# Patient Record
Sex: FEMALE | Race: WHITE | Employment: UNEMPLOYED | URBAN - METROPOLITAN AREA
[De-identification: names, ages, dates, MRNs, and addresses within clinical notes are randomized per-mention and may not be internally consistent; named-entity substitution may affect disease eponyms.]

---

## 2017-12-26 ENCOUNTER — HOSPITAL ENCOUNTER (OUTPATIENT)
Age: 57
Setting detail: SPECIMEN
Discharge: HOME | End: 2017-12-26
Payer: MEDICAID

## 2017-12-26 LAB
ANION GAP SERPL CALCULATED.3IONS-SCNC: 15 MMOL/L (ref 9–17)
BUN BLDV-MCNC: 6 MG/DL (ref 6–20)
BUN/CREAT BLD: ABNORMAL (ref 9–20)
CALCIUM SERPL-MCNC: 7.9 MG/DL (ref 8.6–10.4)
CHLORIDE BLD-SCNC: 99 MMOL/L (ref 98–107)
CO2: 23 MMOL/L (ref 20–31)
CREAT SERPL-MCNC: 0.65 MG/DL (ref 0.5–0.9)
GFR AFRICAN AMERICAN: >60 ML/MIN
GFR NON-AFRICAN AMERICAN: >60 ML/MIN
GFR SERPL CREATININE-BSD FRML MDRD: ABNORMAL ML/MIN/{1.73_M2}
GFR SERPL CREATININE-BSD FRML MDRD: ABNORMAL ML/MIN/{1.73_M2}
GLUCOSE BLD-MCNC: 75 MG/DL (ref 70–99)
HCT VFR BLD CALC: 29.5 % (ref 36.3–47.1)
HEMOGLOBIN: 8.6 G/DL (ref 11.9–15.1)
MCH RBC QN AUTO: 25.6 PG (ref 25.2–33.5)
MCHC RBC AUTO-ENTMCNC: 29.2 G/DL (ref 28.4–34.8)
MCV RBC AUTO: 87.8 FL (ref 82.6–102.9)
PDW BLD-RTO: 19.2 % (ref 11.8–14.4)
PLATELET # BLD: 462 K/UL (ref 138–453)
PMV BLD AUTO: 10.5 FL (ref 8.1–13.5)
POTASSIUM SERPL-SCNC: 4.3 MMOL/L (ref 3.7–5.3)
RBC # BLD: 3.36 M/UL (ref 3.95–5.11)
SODIUM BLD-SCNC: 137 MMOL/L (ref 135–144)
T4 TOTAL: 5.5 UG/DL (ref 4.5–12)
TSH SERPL DL<=0.05 MIU/L-ACNC: 19.14 MIU/L (ref 0.3–5)
WBC # BLD: 11.8 K/UL (ref 3.5–11.3)

## 2017-12-26 PROCEDURE — P9603 ONE-WAY ALLOW PRORATED MILES: HCPCS

## 2017-12-26 PROCEDURE — 84436 ASSAY OF TOTAL THYROXINE: CPT

## 2017-12-26 PROCEDURE — 85027 COMPLETE CBC AUTOMATED: CPT

## 2017-12-26 PROCEDURE — 84443 ASSAY THYROID STIM HORMONE: CPT

## 2017-12-26 PROCEDURE — 36415 COLL VENOUS BLD VENIPUNCTURE: CPT

## 2017-12-26 PROCEDURE — 80048 BASIC METABOLIC PNL TOTAL CA: CPT

## 2017-12-28 ENCOUNTER — HOSPITAL ENCOUNTER (OUTPATIENT)
Age: 57
Setting detail: SPECIMEN
Discharge: HOME | End: 2017-12-28
Payer: MEDICAID

## 2017-12-28 LAB
DATE, STOOL #1: 12
DATE, STOOL #2: NORMAL
DATE, STOOL #3: NORMAL
HEMOCCULT SP1 STL QL: NEGATIVE
HEMOCCULT SP2 STL QL: NORMAL
HEMOCCULT SP3 STL QL: NORMAL
TIME, STOOL #1: 400
TIME, STOOL #2: NORMAL
TIME, STOOL #3: NORMAL

## 2017-12-28 PROCEDURE — 82272 OCCULT BLD FECES 1-3 TESTS: CPT

## 2017-12-29 ENCOUNTER — HOSPITAL ENCOUNTER (OUTPATIENT)
Age: 57
Setting detail: SPECIMEN
Discharge: HOME | End: 2017-12-29
Payer: MEDICAID

## 2017-12-29 LAB — PREALBUMIN: 15.2 MG/DL (ref 20–40)

## 2017-12-29 PROCEDURE — 84134 ASSAY OF PREALBUMIN: CPT

## 2017-12-29 PROCEDURE — P9603 ONE-WAY ALLOW PRORATED MILES: HCPCS

## 2017-12-29 PROCEDURE — 36415 COLL VENOUS BLD VENIPUNCTURE: CPT

## 2018-01-03 ENCOUNTER — HOSPITAL ENCOUNTER (OUTPATIENT)
Dept: WOUND CARE | Age: 58
Discharge: HOME | End: 2018-01-03
Payer: COMMERCIAL

## 2018-01-03 VITALS
WEIGHT: 220 LBS | TEMPERATURE: 99.7 F | RESPIRATION RATE: 20 BRPM | SYSTOLIC BLOOD PRESSURE: 120 MMHG | BODY MASS INDEX: 34.53 KG/M2 | HEIGHT: 67 IN | DIASTOLIC BLOOD PRESSURE: 88 MMHG | HEART RATE: 106 BPM

## 2018-01-03 DIAGNOSIS — S31.109D OPEN WOUND OF ABDOMINAL WALL, SUBSEQUENT ENCOUNTER: ICD-10-CM

## 2018-01-03 DIAGNOSIS — K57.20 DIVERTICULITIS OF LARGE INTESTINE WITH PERFORATION WITHOUT BLEEDING: ICD-10-CM

## 2018-01-03 DIAGNOSIS — T81.89XD DELAYED SURGICAL WOUND HEALING, SUBSEQUENT ENCOUNTER: ICD-10-CM

## 2018-01-03 DIAGNOSIS — E44.0 MODERATE PROTEIN-CALORIE MALNUTRITION (HCC): ICD-10-CM

## 2018-01-03 PROBLEM — E46 MALNUTRITION (HCC): Status: ACTIVE | Noted: 2018-01-03

## 2018-01-03 PROBLEM — T81.89XA DELAYED SURGICAL WOUND HEALING: Status: ACTIVE | Noted: 2018-01-03

## 2018-01-03 PROBLEM — S31.109A OPEN ABDOMINAL WALL WOUND: Status: ACTIVE | Noted: 2018-01-03

## 2018-01-03 PROCEDURE — 87186 SC STD MICRODIL/AGAR DIL: CPT

## 2018-01-03 PROCEDURE — 6370000000 HC RX 637 (ALT 250 FOR IP): Performed by: SURGERY

## 2018-01-03 PROCEDURE — 87075 CULTR BACTERIA EXCEPT BLOOD: CPT

## 2018-01-03 PROCEDURE — 87077 CULTURE AEROBIC IDENTIFY: CPT

## 2018-01-03 PROCEDURE — 97605 NEG PRS WND THER DME<=50SQCM: CPT

## 2018-01-03 PROCEDURE — 99214 OFFICE O/P EST MOD 30 MIN: CPT

## 2018-01-03 PROCEDURE — 87070 CULTURE OTHR SPECIMN AEROBIC: CPT

## 2018-01-03 PROCEDURE — 87205 SMEAR GRAM STAIN: CPT

## 2018-01-03 PROCEDURE — 11043 DBRDMT MUSC&/FSCA 1ST 20/<: CPT

## 2018-01-03 PROCEDURE — 87176 TISSUE HOMOGENIZATION CULTR: CPT

## 2018-01-03 RX ORDER — M-VIT,TX,IRON,MINS/CALC/FOLIC 27MG-0.4MG
1 TABLET ORAL DAILY
COMMUNITY
End: 2018-02-14 | Stop reason: HOSPADM

## 2018-01-03 RX ORDER — FERROUS SULFATE 325(65) MG
325 TABLET ORAL
COMMUNITY
End: 2018-02-14 | Stop reason: RX

## 2018-01-03 RX ORDER — ASCORBIC ACID 500 MG
500 TABLET ORAL DAILY
COMMUNITY
End: 2018-03-26

## 2018-01-03 RX ORDER — ONDANSETRON 4 MG/1
4 TABLET, FILM COATED ORAL EVERY 4 HOURS PRN
COMMUNITY
End: 2018-03-26

## 2018-01-03 RX ORDER — MIRTAZAPINE 7.5 MG/1
7.5 TABLET, FILM COATED ORAL NIGHTLY
COMMUNITY
End: 2018-03-26

## 2018-01-03 RX ORDER — OXYCODONE HYDROCHLORIDE AND ACETAMINOPHEN 5; 325 MG/1; MG/1
1 TABLET ORAL EVERY 4 HOURS PRN
COMMUNITY
End: 2020-02-18

## 2018-01-03 RX ORDER — LEVOTHYROXINE SODIUM 0.03 MG/1
25 TABLET ORAL EVERY MORNING
COMMUNITY
End: 2020-02-18

## 2018-01-03 RX ADMIN — LIDOCAINE HYDROCHLORIDE: 20 JELLY TOPICAL at 09:12

## 2018-01-03 ASSESSMENT — PAIN DESCRIPTION - PAIN TYPE: TYPE: CHRONIC PAIN

## 2018-01-03 ASSESSMENT — PAIN DESCRIPTION - FREQUENCY: FREQUENCY: INTERMITTENT

## 2018-01-03 ASSESSMENT — PAIN DESCRIPTION - DESCRIPTORS: DESCRIPTORS: SHARP;DULL

## 2018-01-03 ASSESSMENT — PAIN DESCRIPTION - LOCATION: LOCATION: ABDOMEN

## 2018-01-03 NOTE — PROGRESS NOTES
Wound Care History and Physical    PATIENT NAME: Oliverio Reece   YOB: 1960    ADMISSION DATE: 1/3/2018  8:30 AM      TODAY'S DATE: 1/3/2018    CHIEF COMPLAINT:  Abdominal wall wound      HISTORY OF PRESENT ILLNESS:  HISTORY of PRESENT ILLNESS HPI   Hema Silva is a 62 y.o. female who presents today for wound evaluation. Wound Type:non-healing surgical  Wound Location:abdomen  Modifying factors:decreased mobility, obesity and non-adherence    There is no problem list on file for this patient. Past Medical History:        Diagnosis Date    Blood circulation, collateral     GERD (gastroesophageal reflux disease)        Past Surgical History:        Procedure Laterality Date    ABDOMEN SURGERY      laparotomy/bowel resection    APPENDECTOMY      CHOLECYSTECTOMY      HERNIA REPAIR      umbilical    SMALL INTESTINE SURGERY         Medications Prior to Admission:   Current Outpatient Prescriptions   Medication Sig Dispense Refill    ferrous sulfate 325 (65 Fe) MG tablet Take 325 mg by mouth daily (with breakfast)      levothyroxine (SYNTHROID) 25 MCG tablet Take 25 mcg by mouth every morning      mirtazapine (REMERON) 7.5 MG tablet Take 7.5 mg by mouth nightly      Multiple Vitamins-Minerals (THERAPEUTIC MULTIVITAMIN-MINERALS) tablet Take 1 tablet by mouth daily      oxyCODONE-acetaminophen (PERCOCET) 5-325 MG per tablet Take 1 tablet by mouth every 4 hours as needed for Pain.       Probiotic Product (PROBIOTIC-10) CAPS Take 1 tablet by mouth 3 times daily      ascorbic acid (VITAMIN C) 500 MG tablet Take 500 mg by mouth daily      ondansetron (ZOFRAN) 4 MG tablet Take 4 mg by mouth every 4 hours as needed for Nausea or Vomiting       Current Facility-Administered Medications   Medication Dose Route Frequency Provider Last Rate Last Dose    lidocaine (XYLOCAINE) 2 % jelly   Topical PRN Ebbie Arm, DO           Allergies:  Codeine    Social History:   Social History Social History    Marital status: Single     Spouse name: N/A    Number of children: N/A    Years of education: N/A     Occupational History          Social History Main Topics    Smoking status: Light Tobacco Smoker    Smokeless tobacco: Never Used      Comment: off and on/with stress    Alcohol use No    Drug use: No    Sexual activity: Not on file     Other Topics Concern    Not on file     Social History Narrative    No narrative on file       Family History:       Problem Relation Age of Onset    Cancer Mother     Coronary Art Dis Mother     Diabetes Mother     Heart Disease Mother     Parkinsonism Mother     Coronary Art Dis Father     Heart Disease Father     Diabetes Brother     Heart Disease Brother     High Cholesterol Brother     High Blood Pressure Brother     Cancer Maternal Aunt     Cancer Maternal Grandmother     Diabetes Brother     High Cholesterol Brother     High Blood Pressure Brother        REVIEW OF SYSTEMS:    CONSTITUTIONAL:  No recent weight gain/loss. Energy level normal for pt. No fever, chills or sweats  HEENT:  No change in vision, no change in hearing,  No cough or hoarseness  CARDIOVASCULAR:  No chest pain, no shortness of breath  GASTROINTESTINAL:  No abdominal pain, nausea, or vomiting. No constipation/diarrhea. No rectal bleeding,   GENITOURINARY:  No dysuria, no pneumoturia  HEMATOLOGIC/LYMPHATIC:  No easy bruising. No rashes No history of cancer  ENDOCRINE:  negative   Review of systems negative unless above.     PHYSICAL EXAM:    VITALS:  /88   Pulse 106   Temp 99.7 °F (37.6 °C) (Tympanic)   Resp 20   Ht 5' 7\" (1.702 m)   Wt 220 lb (99.8 kg)   BMI 34.46 kg/m²   INTAKE/OUTPUT:   No intake or output data in the 24 hours ending 01/03/18 0941    CONSTITUTIONAL:  awake, alert, not distressed and moderately obese  Psychiatric: judgement and insight seem appropriate  Head:  normocephalic/atraumatic, without obvious abnormality  Eyes: PERRL, Sclera nonicteric  NECK:  supple, symmetrical, trachea midline  LUNGS:  CTA bilaterally, no ronchi, rales, or wheezes, no intracostal muscle retractions or accessory muscle use  CARDIOVASCULAR:  regular rate and rhythm and No Murmur, rub,  Or gallops  ABDOMEN: soft, open midline wound with good granulation tissue, fascia layer exposed, right upper quadrant loop colostomy pink and functional  MUSCULOSKELETAL:  negative, there is not obvious somatic dysfunction  NEUROLOGIC:  Mental Status Exam:  Level of Alertness:   alert  Orientation:   oriented to person, place, and time          ASSESSMENT   Patient Active Problem List   Diagnosis    Open abdominal wall wound    Delayed surgical wound healing    Diverticulitis of colon with perforation    Malnutrition ([facility] [address]         Pre Debridement Measurements:  Wound 01/03/18 #1 abdomen,surgery dec. 2017 (Active)   Wound Type Incision 1/3/2018  8:40 AM   Wound Other 1/3/2018  8:40 AM   Dressing Status Old drainage 1/3/2018  8:40 AM   Dressing Changed Changed/New 1/3/2018  8:40 AM   Wound Length (cm) 26 cm 1/3/2018  8:40 AM   Wound Width (cm) 7 cm 1/3/2018  8:40 AM   Wound Depth (cm)  4.5 1/3/2018  8:40 AM   Calculated Wound Size (cm^2) (l*w) 182 cm^2 1/3/2018  8:40 AM   Drainage Amount Moderate 1/3/2018  8:40 AM   Drainage Description Serosanguinous 1/3/2018  8:40 AM   Margins Defined edges; Attached edges 1/3/2018  8:40 AM   Exposed structure Fascia 1/3/2018  8:40 AM   Red%Wound Bed 70 1/3/2018  8:40 AM   Yellow%Wound Bed 30 1/3/2018  8:40 AM   Debridement per physician Muscle 1/3/2018  8:40 AM   Time out Yes 1/3/2018  8:40 AM   Procedural Pain 0 1/3/2018  8:40 AM   Post procedural Pain 0 1/3/2018  8:40 AM   Number of days: 0          Procedure Note    Surgeon: Minh Friedman DO    Anesthesia: lidocaine gel    Debridement: Excisional Debridement    Using curette the wound was sharply debrided    down through and including the removal of subcutaneous

## 2018-01-08 ENCOUNTER — HOSPITAL ENCOUNTER (OUTPATIENT)
Dept: WOUND CARE | Age: 58
Discharge: HOME | End: 2018-01-08
Payer: COMMERCIAL

## 2018-01-08 LAB
CULTURE: ABNORMAL
DIRECT EXAM: ABNORMAL
DIRECT EXAM: ABNORMAL
Lab: ABNORMAL
ORGANISM: ABNORMAL
SPECIMEN DESCRIPTION: ABNORMAL
SPECIMEN DESCRIPTION: ABNORMAL
STATUS: ABNORMAL

## 2018-01-15 ENCOUNTER — HOSPITAL ENCOUNTER (OUTPATIENT)
Age: 58
Setting detail: SPECIMEN
Discharge: HOME | End: 2018-01-15
Payer: MEDICAID

## 2018-01-15 ENCOUNTER — HOSPITAL ENCOUNTER (OUTPATIENT)
Dept: WOUND CARE | Age: 58
Discharge: HOME | End: 2018-01-15
Payer: COMMERCIAL

## 2018-01-15 LAB — PREALBUMIN: 15.3 MG/DL (ref 20–40)

## 2018-01-15 PROCEDURE — 84134 ASSAY OF PREALBUMIN: CPT

## 2018-01-15 PROCEDURE — P9603 ONE-WAY ALLOW PRORATED MILES: HCPCS

## 2018-01-15 PROCEDURE — 36415 COLL VENOUS BLD VENIPUNCTURE: CPT

## 2018-01-17 ENCOUNTER — HOSPITAL ENCOUNTER (OUTPATIENT)
Age: 58
Setting detail: SPECIMEN
Discharge: HOME | End: 2018-01-17
Payer: MEDICAID

## 2018-01-17 LAB
ANION GAP SERPL CALCULATED.3IONS-SCNC: 12 MMOL/L (ref 9–17)
BUN BLDV-MCNC: 12 MG/DL (ref 6–20)
BUN/CREAT BLD: ABNORMAL (ref 9–20)
CALCIUM SERPL-MCNC: 8.2 MG/DL (ref 8.6–10.4)
CHLORIDE BLD-SCNC: 103 MMOL/L (ref 98–107)
CO2: 26 MMOL/L (ref 20–31)
CREAT SERPL-MCNC: 0.48 MG/DL (ref 0.5–0.9)
GFR AFRICAN AMERICAN: >60 ML/MIN
GFR NON-AFRICAN AMERICAN: >60 ML/MIN
GFR SERPL CREATININE-BSD FRML MDRD: ABNORMAL ML/MIN/{1.73_M2}
GFR SERPL CREATININE-BSD FRML MDRD: ABNORMAL ML/MIN/{1.73_M2}
GLUCOSE BLD-MCNC: 97 MG/DL (ref 70–99)
HCT VFR BLD CALC: 31.8 % (ref 36.3–47.1)
HEMOGLOBIN: 9.2 G/DL (ref 11.9–15.1)
MCH RBC QN AUTO: 25.5 PG (ref 25.2–33.5)
MCHC RBC AUTO-ENTMCNC: 28.9 G/DL (ref 28.4–34.8)
MCV RBC AUTO: 88.1 FL (ref 82.6–102.9)
NRBC AUTOMATED: 0 PER 100 WBC
PDW BLD-RTO: 19.7 % (ref 11.8–14.4)
PLATELET # BLD: 451 K/UL (ref 138–453)
PMV BLD AUTO: 10.7 FL (ref 8.1–13.5)
POTASSIUM SERPL-SCNC: 4.6 MMOL/L (ref 3.7–5.3)
RBC # BLD: 3.61 M/UL (ref 3.95–5.11)
SODIUM BLD-SCNC: 141 MMOL/L (ref 135–144)
WBC # BLD: 9.3 K/UL (ref 3.5–11.3)

## 2018-01-17 PROCEDURE — P9603 ONE-WAY ALLOW PRORATED MILES: HCPCS

## 2018-01-17 PROCEDURE — 85027 COMPLETE CBC AUTOMATED: CPT

## 2018-01-17 PROCEDURE — 80048 BASIC METABOLIC PNL TOTAL CA: CPT

## 2018-01-17 PROCEDURE — 36415 COLL VENOUS BLD VENIPUNCTURE: CPT

## 2018-01-19 ENCOUNTER — HOSPITAL ENCOUNTER (OUTPATIENT)
Dept: WOUND CARE | Age: 58
Discharge: HOME | End: 2018-01-19
Payer: MEDICAID

## 2018-01-19 VITALS
DIASTOLIC BLOOD PRESSURE: 72 MMHG | HEART RATE: 111 BPM | TEMPERATURE: 99.5 F | SYSTOLIC BLOOD PRESSURE: 102 MMHG | RESPIRATION RATE: 18 BRPM

## 2018-01-19 DIAGNOSIS — K63.2 COLOCUTANEOUS FISTULA: Chronic | ICD-10-CM

## 2018-01-19 PROBLEM — T81.89XA DELAYED SURGICAL WOUND HEALING: Chronic | Status: ACTIVE | Noted: 2018-01-03

## 2018-01-19 PROBLEM — S31.109A OPEN ABDOMINAL WALL WOUND: Chronic | Status: ACTIVE | Noted: 2018-01-03

## 2018-01-19 PROBLEM — K57.20 DIVERTICULITIS OF COLON WITH PERFORATION: Chronic | Status: ACTIVE | Noted: 2018-01-03

## 2018-01-19 PROBLEM — E46 MALNUTRITION (HCC): Chronic | Status: ACTIVE | Noted: 2018-01-03

## 2018-01-19 PROCEDURE — 11046 DBRDMT MUSC&/FSCA EA ADDL: CPT

## 2018-01-19 PROCEDURE — 11043 DBRDMT MUSC&/FSCA 1ST 20/<: CPT

## 2018-01-19 PROCEDURE — 97606 NEG PRS WND THER DME>50 SQCM: CPT

## 2018-01-19 PROCEDURE — 6370000000 HC RX 637 (ALT 250 FOR IP): Performed by: SURGERY

## 2018-01-19 RX ADMIN — LIDOCAINE HYDROCHLORIDE: 20 JELLY TOPICAL at 14:28

## 2018-01-19 ASSESSMENT — PAIN DESCRIPTION - DESCRIPTORS: DESCRIPTORS: SHARP

## 2018-01-19 ASSESSMENT — PAIN DESCRIPTION - ONSET: ONSET: ON-GOING

## 2018-01-19 ASSESSMENT — PAIN DESCRIPTION - LOCATION: LOCATION: ABDOMEN

## 2018-01-19 ASSESSMENT — PAIN SCALES - GENERAL: PAINLEVEL_OUTOF10: 2

## 2018-01-19 ASSESSMENT — PAIN DESCRIPTION - ORIENTATION: ORIENTATION: MID

## 2018-01-19 ASSESSMENT — PAIN DESCRIPTION - PROGRESSION: CLINICAL_PROGRESSION: NOT CHANGED

## 2018-01-19 ASSESSMENT — PAIN DESCRIPTION - PAIN TYPE: TYPE: CHRONIC PAIN

## 2018-01-19 ASSESSMENT — PAIN DESCRIPTION - FREQUENCY: FREQUENCY: CONTINUOUS

## 2018-01-19 NOTE — PLAN OF CARE
Problem: Wound:  Goal: Will show signs of wound healing; wound closure and no evidence of infection  Will show signs of wound healing; wound closure and no evidence of infection   Outcome: Ongoing  Wound improvement noted. Continuing KCI vac, increasing to 150mmhg continuous.

## 2018-01-22 ENCOUNTER — HOSPITAL ENCOUNTER (OUTPATIENT)
Dept: WOUND CARE | Age: 58
Discharge: HOME | End: 2018-01-22
Payer: MEDICAID

## 2018-01-22 ENCOUNTER — HOSPITAL ENCOUNTER (OUTPATIENT)
Dept: CT IMAGING | Age: 58
Discharge: HOME | End: 2018-01-22
Payer: MEDICAID

## 2018-01-22 VITALS
HEART RATE: 130 BPM | SYSTOLIC BLOOD PRESSURE: 95 MMHG | DIASTOLIC BLOOD PRESSURE: 66 MMHG | TEMPERATURE: 99.3 F | RESPIRATION RATE: 16 BRPM

## 2018-01-22 DIAGNOSIS — L02.91 ABSCESS: ICD-10-CM

## 2018-01-22 DIAGNOSIS — L02.91 ABSCESS: Primary | ICD-10-CM

## 2018-01-22 PROCEDURE — 87070 CULTURE OTHR SPECIMN AEROBIC: CPT

## 2018-01-22 PROCEDURE — 6370000000 HC RX 637 (ALT 250 FOR IP): Performed by: INTERNAL MEDICINE

## 2018-01-22 PROCEDURE — 11042 DBRDMT SUBQ TIS 1ST 20SQCM/<: CPT

## 2018-01-22 PROCEDURE — 86403 PARTICLE AGGLUT ANTBDY SCRN: CPT

## 2018-01-22 PROCEDURE — 99203 OFFICE O/P NEW LOW 30 MIN: CPT | Performed by: INTERNAL MEDICINE

## 2018-01-22 PROCEDURE — 2580000003 HC RX 258: Performed by: INTERNAL MEDICINE

## 2018-01-22 PROCEDURE — 11045 DBRDMT SUBQ TISS EACH ADDL: CPT | Performed by: INTERNAL MEDICINE

## 2018-01-22 PROCEDURE — 6360000004 HC RX CONTRAST MEDICATION: Performed by: INTERNAL MEDICINE

## 2018-01-22 PROCEDURE — 11045 DBRDMT SUBQ TISS EACH ADDL: CPT

## 2018-01-22 PROCEDURE — 87185 SC STD ENZYME DETCJ PER NZM: CPT

## 2018-01-22 PROCEDURE — 87077 CULTURE AEROBIC IDENTIFY: CPT

## 2018-01-22 PROCEDURE — 11042 DBRDMT SUBQ TIS 1ST 20SQCM/<: CPT | Performed by: INTERNAL MEDICINE

## 2018-01-22 PROCEDURE — 87205 SMEAR GRAM STAIN: CPT

## 2018-01-22 PROCEDURE — 87076 CULTURE ANAEROBE IDENT EACH: CPT

## 2018-01-22 PROCEDURE — 74177 CT ABD & PELVIS W/CONTRAST: CPT

## 2018-01-22 PROCEDURE — 87075 CULTR BACTERIA EXCEPT BLOOD: CPT

## 2018-01-22 PROCEDURE — 87186 SC STD MICRODIL/AGAR DIL: CPT

## 2018-01-22 RX ORDER — 0.9 % SODIUM CHLORIDE 0.9 %
100 INTRAVENOUS SOLUTION INTRAVENOUS ONCE
Status: COMPLETED | OUTPATIENT
Start: 2018-01-22 | End: 2018-01-22

## 2018-01-22 RX ORDER — SODIUM CHLORIDE 0.9 % (FLUSH) 0.9 %
10 SYRINGE (ML) INJECTION PRN
Status: DISCONTINUED | OUTPATIENT
Start: 2018-01-22 | End: 2018-01-25 | Stop reason: HOSPADM

## 2018-01-22 RX ORDER — METRONIDAZOLE 500 MG/1
500 TABLET ORAL 3 TIMES DAILY
Qty: 30 TABLET | Refills: 0 | Status: SHIPPED | OUTPATIENT
Start: 2018-01-22 | End: 2018-02-01

## 2018-01-22 RX ORDER — CEPHALEXIN 500 MG/1
500 CAPSULE ORAL 3 TIMES DAILY
Qty: 30 CAPSULE | Refills: 0 | Status: SHIPPED | OUTPATIENT
Start: 2018-01-22 | End: 2018-02-01

## 2018-01-22 RX ADMIN — IOPAMIDOL 100 ML: 755 INJECTION, SOLUTION INTRAVENOUS at 13:47

## 2018-01-22 RX ADMIN — LIDOCAINE HYDROCHLORIDE: 20 JELLY TOPICAL at 11:10

## 2018-01-22 RX ADMIN — SODIUM CHLORIDE 100 ML: 9 INJECTION, SOLUTION INTRAVENOUS at 13:47

## 2018-01-22 RX ADMIN — Medication 10 ML: at 13:47

## 2018-01-22 RX ADMIN — IOHEXOL 50 ML: 240 INJECTION, SOLUTION INTRATHECAL; INTRAVASCULAR; INTRAVENOUS; ORAL at 13:47

## 2018-01-22 ASSESSMENT — PAIN DESCRIPTION - FREQUENCY: FREQUENCY: CONTINUOUS

## 2018-01-22 ASSESSMENT — PAIN SCALES - GENERAL: PAINLEVEL_OUTOF10: 4

## 2018-01-22 ASSESSMENT — PAIN DESCRIPTION - LOCATION: LOCATION: ABDOMEN

## 2018-01-22 ASSESSMENT — PAIN DESCRIPTION - DESCRIPTORS: DESCRIPTORS: SHARP

## 2018-01-22 ASSESSMENT — PAIN DESCRIPTION - PROGRESSION: CLINICAL_PROGRESSION: NOT CHANGED

## 2018-01-22 ASSESSMENT — PAIN DESCRIPTION - PAIN TYPE: TYPE: CHRONIC PAIN

## 2018-01-22 ASSESSMENT — PAIN DESCRIPTION - ORIENTATION: ORIENTATION: MID

## 2018-01-22 ASSESSMENT — PAIN DESCRIPTION - ONSET: ONSET: ON-GOING

## 2018-01-22 NOTE — PLAN OF CARE
Problem: Wound:  Goal: Will show signs of wound healing; wound closure and no evidence of infection  Will show signs of wound healing; wound closure and no evidence of infection   Outcome: Ongoing  Wound stable continue wound vac

## 2018-01-22 NOTE — PROGRESS NOTES
Wound Care Progress Note    Donnell Reece  AGE: 62 y.o. GENDER: female  : 1960  TODAY'S DATE:  2018    Subjective:   CHIEF COMPLAINT:  abdominal wound. 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 She still have 2 STAN drain ,one is draining foul smelling ,cloudy drainage . CT abdomen on  showed possible 2 small abscesses ,she was treated with antibiotics . Wound culture on 1/3/18 grew Ecoli and DIPHTHEROIDS. She denied fever or chills . Wound Type:non-healing surgical  Wound Location:abdomen colocutaneous fistula  Modifying factors:obesity    HISTORY of PRESENT ILLNESS HERMILO Anaya is a 62 y.o. female who presents today for wound evaluation. 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 Pre Debridement Measurements:  Wound 18 #1 abdomen,surgery dec. 2017 (Active)   Wound Image   1/3/2018  8:40 AM   Wound Type Incision 2018 10:10 AM   Wound Other 1/3/2018  8:40 AM   Dressing Status Old drainage 2018 10:10 AM   Dressing Changed Changed/New 2018 10:10 AM   Wound Cleansed Rinsed/Irrigated with saline 2018 10:10 AM   Wound Length (cm) 16.7 cm 2018 10:25 AM   Wound Width (cm) 3 cm 2018 10:25 AM   Wound Depth (cm)  0.7 2018 10:25 AM   Calculated Wound Size (cm^2) (l*w) 50.1 cm^2 2018 10:25 AM

## 2018-01-22 NOTE — PLAN OF CARE
Problem: Wound:  Goal: Will show signs of wound healing; wound closure and no evidence of infection  Will show signs of wound healing; wound closure and no evidence of infection   Outcome: Ongoing  WOUND IS STABLE- DRAINAGE TO STAN IS MILKY GARCIA PINK  CULTURE SENT  CT ABD REORDERED

## 2018-01-22 NOTE — PLAN OF CARE
Problem: Pain:  Goal: Control of chronic pain  Control of chronic pain   Outcome: Ongoing  CLIENTS CHRONIC PAIN IMPROVING

## 2018-01-27 LAB
CULTURE: ABNORMAL
DIRECT EXAM: ABNORMAL
DIRECT EXAM: ABNORMAL
Lab: ABNORMAL
ORGANISM: ABNORMAL
ORGANISM: ABNORMAL
SPECIMEN DESCRIPTION: ABNORMAL
STATUS: ABNORMAL

## 2018-01-31 ENCOUNTER — HOSPITAL ENCOUNTER (OUTPATIENT)
Dept: WOUND CARE | Age: 58
Discharge: HOME | End: 2018-01-31
Payer: MEDICAID

## 2018-01-31 VITALS
SYSTOLIC BLOOD PRESSURE: 129 MMHG | TEMPERATURE: 99 F | BODY MASS INDEX: 30.45 KG/M2 | DIASTOLIC BLOOD PRESSURE: 86 MMHG | HEIGHT: 67 IN | RESPIRATION RATE: 20 BRPM | WEIGHT: 194 LBS | HEART RATE: 98 BPM

## 2018-01-31 PROCEDURE — 11045 DBRDMT SUBQ TISS EACH ADDL: CPT

## 2018-01-31 PROCEDURE — 11042 DBRDMT SUBQ TIS 1ST 20SQCM/<: CPT

## 2018-01-31 PROCEDURE — 6370000000 HC RX 637 (ALT 250 FOR IP): Performed by: SURGERY

## 2018-01-31 RX ADMIN — LIDOCAINE HYDROCHLORIDE: 20 JELLY TOPICAL at 11:04

## 2018-01-31 ASSESSMENT — PAIN DESCRIPTION - DESCRIPTORS: DESCRIPTORS: SHARP

## 2018-01-31 ASSESSMENT — PAIN DESCRIPTION - FREQUENCY: FREQUENCY: INTERMITTENT

## 2018-01-31 ASSESSMENT — PAIN SCALES - GENERAL: PAINLEVEL_OUTOF10: 1

## 2018-01-31 ASSESSMENT — PAIN DESCRIPTION - LOCATION: LOCATION: ABDOMEN

## 2018-01-31 ASSESSMENT — PAIN DESCRIPTION - PAIN TYPE: TYPE: CHRONIC PAIN

## 2018-01-31 NOTE — PLAN OF CARE
Problem: Wound:  Goal: Will show signs of wound healing; wound closure and no evidence of infection  Will show signs of wound healing; wound closure and no evidence of infection   Outcome: Ongoing      Problem: Falls - Risk of:  Goal: Will remain free from falls  Will remain free from falls   Outcome: Ongoing

## 2018-01-31 NOTE — PROGRESS NOTES
[facility]   Progress Note and Procedure Note      Akua Pollard  MEDICAL RECORD NUMBER:  [id number]  AGE: 62 y.o. GENDER: female  : 1960  EPISODE DATE:  2018    Subjective:     Chief Complaint   Patient presents with    Wound Check     abdomen         HISTORY of PRESENT ILLNESS HPI    Abdominal pain improved. Right sided STAN drain pulled yesterday at [facility]. Right STAN drain pulled today, no drainage for over 1 week per pt. Akua Pollard is a 62 y.o. female who presents today for wound/ulcer evaluation.    History of Wound Context: pt has open abdominal wound following laparotomy for perforated diverticulitis, frozen abdomen  Wound/Ulcer Pain Timing/Severity: constant  Quality of pain: sharp  Severity:  5 / 10   Modifying Factors: Pain worsens with walking  Associated Signs/Symptoms: none    Ulcer Identification:  Ulcer Type: non-healing surgical  Contributing Factors: malnutrition and non-adherence    Wound: Nonhealing surgical due to infection        PAST MEDICAL HISTORY        Diagnosis Date    Blood circulation, collateral     GERD (gastroesophageal reflux disease)        PAST SURGICAL HISTORY    Past Surgical History:   Procedure Laterality Date    ABDOMEN SURGERY      laparotomy/bowel resection    APPENDECTOMY      CHOLECYSTECTOMY      HERNIA REPAIR      umbilical    SMALL INTESTINE SURGERY         FAMILY HISTORY    Family History   Problem Relation Age of Onset    Cancer Mother     Coronary Art Dis Mother     Diabetes Mother     Heart Disease Mother     Parkinsonism Mother     Coronary Art Dis Father     Heart Disease Father     Diabetes Brother     Heart Disease Brother     High Cholesterol Brother     High Blood Pressure Brother     Cancer Maternal Aunt     Cancer Maternal Grandmother     Diabetes Brother     High Cholesterol Brother     High Blood Pressure Brother        SOCIAL HISTORY    Social History   Substance Use Topics    Smoking out Yes 1/31/2018 11:05 AM   Procedural Pain 0 1/22/2018 10:25 AM   Post procedural Pain 0 1/22/2018 10:25 AM   Number of days: 28       Percent of Wound(s)/Ulcer(s) Debrided: 100%    Total Surface Area Debrided: 43.5 sq cm       Diabetic/Pressure/Non Pressure Ulcers only:  Ulcer: N/A      Estimated Blood Loss:  Minimal    Hemostasis Achieved:  by pressure    Procedural Pain:  0  / 10     Post Procedural Pain:  0 / 10     Response to treatment:  Well tolerated by patient. Plan:     Treatment Note please see attached Discharge Instructions    Written patient dismissal instructions given to patient and signed by patient or POA. Continue wound vac to midline abdominal wound at -125mmhg continuous     Discharge Instructions                 [facility]                                 Visit Starr Mchugh Instructions / Physician Orders  DATE: 01/31/2018     Home Care: Patient is at [address] [place] [address] St:     Wound Location: Mid Abdomen     Cleanse with: Normal Saline     Dressing Orders: KCI VAC: Apply barrier prep and drape to leander wound, Fill  with black foam, 125 mmhg continuous pressure. DO NOT USE WHITE FOAM. Apply dry gauze dressing to removed drain sites.     Frequency: MON-WED-FRI, PLEASE ENSURE THAT VAC AND SUPPLIES ARE SENT TO WOUND CARE. WOUND CARE WILL APPLY VAC AT APPOINTMENT                    Additional Orders: Increase protein to diet (meat, cheese, eggs, fish, peanut butter, nuts and beans)  Multivitamin daily  Continue Keflex po    500mg 3x per day   For 10 days            Flagyl po   500mg 3x per day    For 10 days       HYPERBARIC TREATMENT-     NA           TREATMENT # NA     Your next appointment with [address] [place] [address] is in 1 week with   (Please note your next appointment above and if you are unable to keep, kindly give a 24 hour notice.  Thank you.)     If you experience any of the following, please call the Wound [facility] during business hours:  [phone]     * Increase in Pain  * Temperature over 101  * Increase in drainage from your wound  * Drainage with a foul odor  * Bleeding  * Increase in swelling  * Need for compression bandage changes due to slippage, breakthrough drainage.     If you need medical attention outside of the business hours of the [address] [place] [address] Road please contact your PCP or go to the nearest emergency room.        Patient Signature:__________________________________________________Date:_______  Electronically signed by Suzi Fofana RN on 1/31/2018 at 11:25 AM         Electronically signed by Adis López DO on 1/31/2018 at 11:28 AM

## 2018-02-14 ENCOUNTER — HOSPITAL ENCOUNTER (OUTPATIENT)
Dept: WOUND CARE | Age: 58
Discharge: HOME | End: 2018-02-14
Payer: COMMERCIAL

## 2018-02-14 VITALS
BODY MASS INDEX: 29.82 KG/M2 | DIASTOLIC BLOOD PRESSURE: 76 MMHG | HEART RATE: 101 BPM | HEIGHT: 67 IN | SYSTOLIC BLOOD PRESSURE: 112 MMHG | WEIGHT: 190 LBS | TEMPERATURE: 98.4 F | RESPIRATION RATE: 18 BRPM

## 2018-02-14 PROCEDURE — 97605 NEG PRS WND THER DME<=50SQCM: CPT

## 2018-02-14 PROCEDURE — 11042 DBRDMT SUBQ TIS 1ST 20SQCM/<: CPT

## 2018-02-14 PROCEDURE — 11045 DBRDMT SUBQ TISS EACH ADDL: CPT

## 2018-02-14 PROCEDURE — 6370000000 HC RX 637 (ALT 250 FOR IP): Performed by: SURGERY

## 2018-02-14 RX ORDER — LIDOCAINE HYDROCHLORIDE 40 MG/ML
SOLUTION TOPICAL ONCE
Status: COMPLETED | OUTPATIENT
Start: 2018-02-14 | End: 2018-02-14

## 2018-02-14 RX ADMIN — LIDOCAINE HYDROCHLORIDE: 40 SOLUTION TOPICAL at 10:57

## 2018-02-14 ASSESSMENT — PAIN SCALES - GENERAL: PAINLEVEL_OUTOF10: 0

## 2018-02-14 NOTE — PROGRESS NOTES
Negative Pressure    NAME:  Isac Zamorano  YOB: 1960  MEDICAL RECORD NUMBER:  [id number]  DATE:  2/14/2018     [] Removed old wound/ulcer Negative pressure therapy dressing if indicated     and cleansed wound gently with normal saline.  Applied Negative Pressure 125mmhg to  wound(s)/ulcer(s).  [x] Applied skin barrier prep to leander-wound.  [x] Cut strips of plastic drape to picture frame wound so that leander-wound is     covered with the drape.  [] If bridging dressing to less prominent site, cover any intact skin that will come in contact with the Negative Pressure Therapy sponge, gauze or channel drain with plastic drape. The sponge should never touch intact skin.  [x] Cut sponge, gauze or channel drain to size which will fit into the wound/ulcer bed without being forced.  [x] Be sure the sponge is large enough to hold the entire round plastic flange which is attached to the tubing. Never allow flange to be larger than the sponge or it will produce suction damaging intact skin.  Total number of individual pieces of foam used within the wound bed: 1     [] If bridging the dressing away from the primary site, be sure the bridge leads to a piece of sponge large enough to hold the entire flange without allowing any of the flange to overlap onto intact skin.  [x] Covered sponge, gauze or channel drain with plastic drape.  [x] Cut a hole in this plastic drape directly over the sponge the same size as the plastic drain tubing.  [] Removed plastic liner from flange and apply it directly over the hole you cut.  [] Removed the plastic cover from the flange.  [x] Attached the tubing to the wound/ulcer Negative Pressure Therapy and turn it on to be sure a vacuum is created and that there are no leaks.  [x] If air leaks occur, use plastic drape to patch them.  [] Secured Negative Pressure Therapy dressing with ace wrap loosely if located on an extremity.  Maintain tubing outside of ace wrap. Tubing must not exert pressure on intact skin.     Applied per  Guidelines      Electronically signed by Danyelle Clarke RN on 2/14/2018 at 11:53 AM

## 2018-02-14 NOTE — PROGRESS NOTES
Anesthetic  Anesthetic: 4% Lidocaine Liquid Topical       Debridement:Excisional Debridement    Using curette the wound(s)/ulcer(s) was/were sharply debrided down through and including the removal of subcutaneous tissue. Devitalized Tissue Debrided:  fibrin, biofilm and slough    Pre Debridement Measurements:  Are located in the [facility]  Documentation Flow Sheet    Wound/Ulcer #: 1    Post Debridement Measurements:  Wound/Ulcer Descriptions are Pre Debridement except measurements:    Wound 01/03/18 #1 abdomen,surgery dec. 2017 (Active)   Wound Image   1/31/2018 10:59 AM   Wound Type Incision 2/14/2018 10:33 AM   Wound Other 1/3/2018  8:40 AM   Dressing Status Old drainage 2/14/2018 10:33 AM   Dressing Changed Changed/New 2/14/2018 10:33 AM   Wound Cleansed Rinsed/Irrigated with saline 2/14/2018 10:33 AM   Wound Length (cm) 12.5 cm 2/14/2018 11:29 AM   Wound Width (cm) 3 cm 2/14/2018 11:29 AM   Wound Depth (cm)  0.3 2/14/2018 11:29 AM   Calculated Wound Size (cm^2) (l*w) 37.5 cm^2 2/14/2018 11:29 AM   Change in Wound Size % (l*w) 79.4 2/14/2018 11:29 AM   Wound Assessment Drainage;Fibrin;Pink;Red;Yellow 2/14/2018 10:33 AM   Drainage Amount Moderate 2/14/2018 10:33 AM   Drainage Description Serosanguinous 2/14/2018 10:33 AM   Odor None 2/14/2018 10:33 AM   Margins Defined edges 2/14/2018 10:33 AM   Exposed structure Muscle 1/22/2018 10:10 AM   Elyssa-wound Assessment Clean;Dry; Intact; Pink 2/14/2018 10:33 AM   [place]%Wound Bed 85 2/14/2018 10:33 AM   Red%Wound Bed 5 2/14/2018 10:33 AM   Yellow%Wound Bed 10 2/14/2018 10:33 AM   Debridement per physician Subcutaneous 1/22/2018 10:25 AM   Time out Yes 1/31/2018 11:05 AM   Procedural Pain 0 1/22/2018 10:25 AM   Post procedural Pain 0 1/22/2018 10:25 AM   Number of days: 42       Percent of Wound(s)/Ulcer(s) Debrided: 100%    Total Surface Area Debrided:  37.5 sq cm       Diabetic/Pressure/Non Pressure Ulcers only:  Ulcer: N/A      Estimated Blood Loss: the nearest emergency room.        Patient Signature:__________________________________________________Date:_______  Electronically signed by Mike Wyatt RN on 2/14/2018 at 11:27 AM     Electronically signed by Dominique Matthew DO on 2/14/2018 at 11:42 AM          Electronically signed by Dominique Matthew DO on 2/14/2018 at 11:43 AM

## 2018-02-21 ENCOUNTER — HOSPITAL ENCOUNTER (OUTPATIENT)
Dept: WOUND CARE | Age: 58
Discharge: HOME | End: 2018-02-21
Payer: MEDICAID

## 2018-02-21 VITALS
DIASTOLIC BLOOD PRESSURE: 76 MMHG | RESPIRATION RATE: 18 BRPM | TEMPERATURE: 98.9 F | HEIGHT: 67 IN | HEART RATE: 100 BPM | SYSTOLIC BLOOD PRESSURE: 109 MMHG | WEIGHT: 190 LBS | BODY MASS INDEX: 29.82 KG/M2

## 2018-02-21 PROCEDURE — 97607 NEG PRS WND THR NDME<=50SQCM: CPT

## 2018-02-21 PROCEDURE — 11042 DBRDMT SUBQ TIS 1ST 20SQCM/<: CPT

## 2018-02-21 PROCEDURE — 97605 NEG PRS WND THER DME<=50SQCM: CPT

## 2018-02-21 PROCEDURE — 6370000000 HC RX 637 (ALT 250 FOR IP): Performed by: SURGERY

## 2018-02-21 RX ORDER — LIDOCAINE HYDROCHLORIDE 40 MG/ML
SOLUTION TOPICAL ONCE
Status: COMPLETED | OUTPATIENT
Start: 2018-02-21 | End: 2018-02-21

## 2018-02-21 RX ORDER — ACETAMINOPHEN 325 MG/1
325 TABLET ORAL
COMMUNITY
End: 2018-03-26

## 2018-02-21 RX ADMIN — LIDOCAINE HYDROCHLORIDE: 40 SOLUTION TOPICAL at 11:12

## 2018-02-21 ASSESSMENT — PAIN SCALES - GENERAL: PAINLEVEL_OUTOF10: 1

## 2018-02-21 ASSESSMENT — PAIN DESCRIPTION - FREQUENCY: FREQUENCY: INTERMITTENT

## 2018-02-21 ASSESSMENT — PAIN DESCRIPTION - DESCRIPTORS: DESCRIPTORS: DULL

## 2018-02-21 ASSESSMENT — PAIN DESCRIPTION - PAIN TYPE: TYPE: CHRONIC PAIN

## 2018-02-21 ASSESSMENT — PAIN DESCRIPTION - LOCATION: LOCATION: ABDOMEN

## 2018-02-21 NOTE — PROGRESS NOTES
Reactions    Codeine        MEDICATIONS    Current Outpatient Prescriptions on File Prior to Encounter   Medication Sig Dispense Refill    levothyroxine (SYNTHROID) 25 MCG tablet Take 25 mcg by mouth every morning      mirtazapine (REMERON) 7.5 MG tablet Take 7.5 mg by mouth nightly      oxyCODONE-acetaminophen (PERCOCET) 5-325 MG per tablet Take 1 tablet by mouth every 4 hours as needed for Pain.  Probiotic Product (PROBIOTIC-10) CAPS Take 1 tablet by mouth 3 times daily      ascorbic acid (VITAMIN C) 500 MG tablet Take 500 mg by mouth daily      ondansetron (ZOFRAN) 4 MG tablet Take 4 mg by mouth every 4 hours as needed for Nausea or Vomiting       No current facility-administered medications on file prior to encounter. REVIEW OF SYSTEMS    Pertinent items are noted in HPI. Objective:      /76   Pulse 100   Temp 98.9 °F (37.2 °C) (Tympanic)   Resp 18   Ht 5' 7\" (1.702 m)   Wt 190 lb (86.2 kg)   BMI 29.76 kg/m²     Wt Readings from Last 3 Encounters:   02/21/18 190 lb (86.2 kg)   02/14/18 190 lb (86.2 kg)   01/31/18 194 lb (88 kg)       PHYSICAL EXAM    General Appearance: alert and oriented to person, place and time, well-developed and well-nourished, in no acute distress  Skin: open abdominal wound with granulation tissue at base  Abdomen soft, non-distended, non-tender, ostomy functioning well, IR drain with purulent drainage      Assessment:      Patient Active Problem List   Diagnosis Code    Open abdominal wall wound S31.109A    Delayed surgical wound healing T81.89XA    Diverticulitis of colon with perforation K57.20    Malnutrition ([address] E46    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     Performed by: Sav Malloy DO    Consent obtained:  Yes    Time out taken:  Yes    Pain Control: by pressure    Procedural Pain:  2  / 10     Post Procedural Pain:  2 / 10     Response to treatment:  Well tolerated by patient. Plan:     Treatment Note please see attached Discharge Instructions    Written patient dismissal instructions given to patient and signed by patient or POA. Pt was instructed again to record drain output and bring record of output to her next appointment. Detailed instruction was given and an order was placed to home care to record this output. Discharge Instructions                      [facility]                                 Visit Zee Instructions / Physician Orders  DATE: 2/21/2018     Home Care: [facility]     SUPPLIES ORDERED THRU:     Wound Location: Mid Abdomen     Cleanse with: normal saline, may shower with soap and water (DO NOT SHOWER WITH WOUND VAC DRESSING ON)     Dressing Orders: KCI VAC: Apply barrier prep and drape to leander wound, Fill  with black foam, 125 mmhg continuous pressure. DO NOT USE WHITE FOAM.      Frequency: MON-WED-FRI, PLEASE ENSURE THAT VAC AND SUPPLIES ARE SENT TO WOUND CARE. WOUND CARE WILL APPLY VAC AT APPOINTMENT                    Additional Orders: Increase protein to diet (meat, cheese, eggs, fish, peanut butter, nuts and beans)  Multivitamin daily  Please empty and record drain bag drainage. Patient needs to bring in the list of measurements at every appointment.  [address] # NA     Your next appointment with [facility] is in 1 week with  as well as an ostomy appointment with Pablo Grossman CNP     (Please note your next appointment above and if you are unable to keep, kindly give a 24 hour notice.  Thank you.)     If you experience any of the following, please call the [facility] during business hours:  [phone]     * Increase in Pain  * Temperature over 101  * Increase in drainage from your wound  * Drainage with a

## 2018-02-26 ENCOUNTER — HOSPITAL ENCOUNTER (OUTPATIENT)
Dept: WOUND CARE | Age: 58
Discharge: HOME | End: 2018-02-26
Payer: MEDICAID

## 2018-02-26 VITALS
HEIGHT: 67 IN | TEMPERATURE: 98.8 F | DIASTOLIC BLOOD PRESSURE: 75 MMHG | WEIGHT: 190 LBS | RESPIRATION RATE: 18 BRPM | SYSTOLIC BLOOD PRESSURE: 116 MMHG | HEART RATE: 97 BPM | BODY MASS INDEX: 29.82 KG/M2

## 2018-02-26 DIAGNOSIS — K57.20 DIVERTICULITIS OF LARGE INTESTINE WITH PERFORATION WITHOUT BLEEDING: Primary | Chronic | ICD-10-CM

## 2018-02-26 PROCEDURE — 11042 DBRDMT SUBQ TIS 1ST 20SQCM/<: CPT

## 2018-02-26 PROCEDURE — 11042 DBRDMT SUBQ TIS 1ST 20SQCM/<: CPT | Performed by: INTERNAL MEDICINE

## 2018-02-26 PROCEDURE — 6370000000 HC RX 637 (ALT 250 FOR IP): Performed by: INTERNAL MEDICINE

## 2018-02-26 RX ORDER — METRONIDAZOLE 500 MG/1
500 TABLET ORAL 3 TIMES DAILY
Qty: 42 TABLET | Refills: 0 | Status: SHIPPED | OUTPATIENT
Start: 2018-02-26 | End: 2018-03-12

## 2018-02-26 RX ORDER — CEPHALEXIN 500 MG/1
500 CAPSULE ORAL 3 TIMES DAILY
Qty: 42 CAPSULE | Refills: 0 | Status: SHIPPED | OUTPATIENT
Start: 2018-02-26 | End: 2020-02-18

## 2018-02-26 RX ORDER — LIDOCAINE HYDROCHLORIDE 40 MG/ML
SOLUTION TOPICAL ONCE
Status: COMPLETED | OUTPATIENT
Start: 2018-02-26 | End: 2018-02-26

## 2018-02-26 RX ADMIN — LIDOCAINE HYDROCHLORIDE 5 ML: 40 SOLUTION TOPICAL at 12:14

## 2018-02-26 ASSESSMENT — PAIN SCALES - GENERAL: PAINLEVEL_OUTOF10: 1

## 2018-02-26 ASSESSMENT — PAIN DESCRIPTION - PAIN TYPE: TYPE: CHRONIC PAIN

## 2018-02-26 ASSESSMENT — PAIN DESCRIPTION - FREQUENCY: FREQUENCY: INTERMITTENT

## 2018-02-26 ASSESSMENT — PAIN DESCRIPTION - PROGRESSION: CLINICAL_PROGRESSION: NOT CHANGED

## 2018-02-26 ASSESSMENT — PAIN DESCRIPTION - LOCATION: LOCATION: ABDOMEN

## 2018-02-26 ASSESSMENT — PAIN DESCRIPTION - DESCRIPTORS: DESCRIPTORS: ACHING;DULL

## 2018-02-26 ASSESSMENT — PAIN DESCRIPTION - ORIENTATION: ORIENTATION: MID

## 2018-02-26 ASSESSMENT — PAIN DESCRIPTION - ONSET: ONSET: ON-GOING

## 2018-02-26 NOTE — PROGRESS NOTES
Heart Disease Mother     Parkinsonism Mother     Coronary Art Dis Father     Heart Disease Father     Diabetes Brother     Heart Disease Brother     High Cholesterol Brother     High Blood Pressure Brother     Cancer Maternal Aunt     Cancer Maternal Grandmother     Diabetes Brother     High Cholesterol Brother     High Blood Pressure Brother        SOCIAL HISTORY    Social History   Substance Use Topics    Smoking status: Light Tobacco Smoker    Smokeless tobacco: Never Used      Comment: off and on/with stress    Alcohol use No       ALLERGIES    Allergies   Allergen Reactions    Codeine        MEDICATIONS    Current Outpatient Prescriptions on File Prior to Encounter   Medication Sig Dispense Refill    acetaminophen (TYLENOL) 325 MG tablet Take 325 mg by mouth      oxyCODONE-acetaminophen (PERCOCET) 5-325 MG per tablet Take 1 tablet by mouth every 4 hours as needed for Pain.  levothyroxine (SYNTHROID) 25 MCG tablet Take 25 mcg by mouth every morning      mirtazapine (REMERON) 7.5 MG tablet Take 7.5 mg by mouth nightly      Probiotic Product (PROBIOTIC-10) CAPS Take 1 tablet by mouth 3 times daily      ascorbic acid (VITAMIN C) 500 MG tablet Take 500 mg by mouth daily      ondansetron (ZOFRAN) 4 MG tablet Take 4 mg by mouth every 4 hours as needed for Nausea or Vomiting       No current facility-administered medications on file prior to encounter.         REVIEW OF SYSTEMS  Denied N/V/D       Objective:      /75   Pulse 97   Temp 98.8 °F (37.1 °C) (Tympanic)   Resp 18   Ht 5' 7\" (1.702 m)   Wt 190 lb (86.2 kg)   BMI 29.76 kg/m²     Wt Readings from Last 3 Encounters:   02/26/18 190 lb (86.2 kg)   02/21/18 190 lb (86.2 kg)   02/14/18 190 lb (86.2 kg)       PHYSICAL EXAM  AO3,NAD           Assessment:      Patient Active Problem List   Diagnosis Code    Open abdominal wall wound S31.109A    Delayed surgical wound healing T81.89XA    Diverticulitis of colon with perforation 1/22/2018 10:25 AM   Time out Yes 2/26/2018 12:20 PM   Procedural Pain 0 1/22/2018 10:25 AM   Post procedural Pain 0 1/22/2018 10:25 AM   Number of days: 54       Wound 02/26/18 Other (Comment) Abdomen Mid;Distal #2 mid distal abdomen,split from wound #1 2/26/18 (Active)   Wound Type Wound 2/26/2018 12:20 PM   Wound Other 2/26/2018 12:20 PM   Wound Length (cm) 1 cm 2/26/2018 12:20 PM   Wound Width (cm) 0.5 cm 2/26/2018 12:20 PM   Wound Depth (cm)  0.3 2/26/2018 12:20 PM   Calculated Wound Size (cm^2) (l*w) 0.5 cm^2 2/26/2018 12:20 PM   Number of days: 0       Percent of Wound(s)/Ulcer(s) Debrided: 100%    Total Surface Area Debrided:  8sq cm             Estimated Blood Loss:  Minimal    Hemostasis Achieved:  not needed    Procedural Pain:  0  / 10     Post Procedural Pain:  0 / 10     Response to treatment:  Well tolerated by patient. Diagnosis    Open abdominal wall wound    Delayed surgical wound healing    Diverticulitis of colon with perforation and abscess     Malnutrition ([address]    Colocutaneous fistula    Abscess          Plan:      PO Keflex and Flagyl for 2 weeks . F/U CT abdomen in 2 weeks . Hold wound vac for 1 week . Silver zelda   Treatment Note please see attached Discharge Instructions    Written patient dismissal instructions given to patient and signed by patient or POA. Discharge Instructions                 [facility]                                 Visit Zee Instructions / Physician Orders  DATE: 2/26/2018     Home Care: [facility]     SUPPLIES ORDERED THRU:     Wound Location: Mid Abdomen     Cleanse with: normal saline, may shower with soap and water (DO NOT SHOWER WITH WOUND VAC DRESSING ON)     Dressing Orders: HOLD WOUND VAC FOR ONE WEEK.  SILVERCEL, DRY GAUZE, MEFIX TAPE TO WOUNDS, DRAIN SPONGE TO DRAINAGE BAG     Frequency: DAILY                   Additional Orders: Increase protein to diet (meat, cheese, eggs, fish, peanut butter, nuts and beans)  Multivitamin daily  START KEFLEX AND FLAGYL AND ORDERED. Please empty and record drain bag drainage. Patient needs to bring in the list of measurements at every appointment.  [address] # NA     Your next appointment [facility] [address] [place] [address] [facility] Dr. Juan R Connelly and week after with Dr. Sirisha Boone.     (Please note your next appointment above and if you are unable to keep, kindly give a 24 hour notice.  Thank you.)     If you experience any of the following, please call the [address] [place] [address] during business hours:  [phone]     * Increase in Pain  * Temperature over 101  * Increase in drainage from your wound  * Drainage with a foul odor  * Bleeding  * Increase in swelling  * Need for compression bandage changes due to slippage, breakthrough drainage.     If you need medical attention outside of the business hours of the [address] [place] [address] please contact your PCP or go to the nearest emergency room.     Patient Signature:__________________________________________________Date:_______  Electronically signed by Serenity León RN on 2/26/2018 at 12:16 PM  Electronically signed by Nu Pearson MD on 2/26/2018 at 12:33 PM        Electronically signed by Nu Paerson MD on 2/26/2018 at 12:33 PM

## 2018-03-07 ENCOUNTER — HOSPITAL ENCOUNTER (OUTPATIENT)
Dept: CT IMAGING | Age: 58
Discharge: HOME | End: 2018-03-09
Payer: MEDICAID

## 2018-03-07 ENCOUNTER — HOSPITAL ENCOUNTER (OUTPATIENT)
Dept: WOUND CARE | Age: 58
Discharge: HOME | End: 2018-03-07
Payer: MEDICAID

## 2018-03-07 VITALS — DIASTOLIC BLOOD PRESSURE: 81 MMHG | SYSTOLIC BLOOD PRESSURE: 105 MMHG | RESPIRATION RATE: 20 BRPM | TEMPERATURE: 98.4 F

## 2018-03-07 DIAGNOSIS — K57.20 DIVERTICULITIS OF LARGE INTESTINE WITH PERFORATION WITHOUT BLEEDING: Chronic | ICD-10-CM

## 2018-03-07 PROCEDURE — 11042 DBRDMT SUBQ TIS 1ST 20SQCM/<: CPT

## 2018-03-07 PROCEDURE — 6360000004 HC RX CONTRAST MEDICATION: Performed by: INTERNAL MEDICINE

## 2018-03-07 PROCEDURE — 6370000000 HC RX 637 (ALT 250 FOR IP): Performed by: SURGERY

## 2018-03-07 PROCEDURE — 74176 CT ABD & PELVIS W/O CONTRAST: CPT

## 2018-03-07 RX ORDER — SODIUM CHLORIDE 0.9 % (FLUSH) 0.9 %
10 SYRINGE (ML) INJECTION PRN
Status: DISCONTINUED | OUTPATIENT
Start: 2018-03-07 | End: 2018-03-07

## 2018-03-07 RX ORDER — 0.9 % SODIUM CHLORIDE 0.9 %
100 INTRAVENOUS SOLUTION INTRAVENOUS ONCE
Status: DISCONTINUED | OUTPATIENT
Start: 2018-03-07 | End: 2018-03-07

## 2018-03-07 RX ORDER — LIDOCAINE HYDROCHLORIDE 40 MG/ML
SOLUTION TOPICAL ONCE
Status: COMPLETED | OUTPATIENT
Start: 2018-03-07 | End: 2018-03-07

## 2018-03-07 RX ADMIN — LIDOCAINE HYDROCHLORIDE 5 ML: 40 SOLUTION TOPICAL at 11:23

## 2018-03-07 RX ADMIN — IOHEXOL 50 ML: 240 INJECTION, SOLUTION INTRATHECAL; INTRAVASCULAR; INTRAVENOUS; ORAL at 15:12

## 2018-03-07 ASSESSMENT — PAIN SCALES - GENERAL: PAINLEVEL_OUTOF10: 0

## 2018-03-07 NOTE — PROGRESS NOTES
Increase in swelling  * Need for compression bandage changes due to slippage, breakthrough drainage.     If you need medical attention outside of the business hours of the [address] [place] [address] Road please contact your PCP or go to the nearest emergency room.     Patient Signature:__________________________________________________Date:_______  Electronically signed by Jhon Contreras RN on 3/7/2018 at 11:47 AM     Electronically signed by Ray Diaz DO on 3/7/2018 at 11:47 AM          Electronically signed by Ray Diaz DO on 3/7/2018 at 11:48 AM

## 2018-03-12 ENCOUNTER — HOSPITAL ENCOUNTER (OUTPATIENT)
Dept: WOUND CARE | Age: 58
Discharge: HOME | End: 2018-03-12
Payer: MEDICAID

## 2018-03-12 VITALS
BODY MASS INDEX: 29.82 KG/M2 | HEIGHT: 67 IN | RESPIRATION RATE: 20 BRPM | HEART RATE: 82 BPM | TEMPERATURE: 98.3 F | WEIGHT: 190 LBS | SYSTOLIC BLOOD PRESSURE: 117 MMHG | DIASTOLIC BLOOD PRESSURE: 75 MMHG

## 2018-03-12 PROCEDURE — 11042 DBRDMT SUBQ TIS 1ST 20SQCM/<: CPT

## 2018-03-12 PROCEDURE — 11042 DBRDMT SUBQ TIS 1ST 20SQCM/<: CPT | Performed by: INTERNAL MEDICINE

## 2018-03-12 PROCEDURE — 6370000000 HC RX 637 (ALT 250 FOR IP): Performed by: INTERNAL MEDICINE

## 2018-03-12 RX ORDER — METRONIDAZOLE 500 MG/1
500 TABLET ORAL 3 TIMES DAILY
Qty: 42 TABLET | Refills: 0 | Status: SHIPPED | OUTPATIENT
Start: 2018-03-12 | End: 2018-03-26

## 2018-03-12 RX ORDER — LIDOCAINE HYDROCHLORIDE 40 MG/ML
SOLUTION TOPICAL ONCE
Status: COMPLETED | OUTPATIENT
Start: 2018-03-12 | End: 2018-03-12

## 2018-03-12 RX ORDER — CEPHALEXIN 500 MG/1
500 CAPSULE ORAL 3 TIMES DAILY
Qty: 42 CAPSULE | Refills: 0 | Status: SHIPPED | OUTPATIENT
Start: 2018-03-12 | End: 2018-03-26

## 2018-03-12 RX ADMIN — LIDOCAINE HYDROCHLORIDE 5 ML: 40 SOLUTION TOPICAL at 11:14

## 2018-03-12 ASSESSMENT — PAIN SCALES - GENERAL: PAINLEVEL_OUTOF10: 0

## 2018-03-12 NOTE — PROGRESS NOTES
Diabetes Mother     Heart Disease Mother     Parkinsonism Mother     Coronary Art Dis Father     Heart Disease Father     Diabetes Brother     Heart Disease Brother     High Cholesterol Brother     High Blood Pressure Brother     Cancer Maternal Aunt     Cancer Maternal Grandmother     Diabetes Brother     High Cholesterol Brother     High Blood Pressure Brother        SOCIAL HISTORY    Social History   Substance Use Topics    Smoking status: Light Tobacco Smoker    Smokeless tobacco: Never Used      Comment: off and on/with stress    Alcohol use No       ALLERGIES    Allergies   Allergen Reactions    Codeine        MEDICATIONS    Current Outpatient Prescriptions on File Prior to Encounter   Medication Sig Dispense Refill    cephALEXin (KEFLEX) 500 MG capsule Take 1 capsule by mouth 3 times daily 42 capsule 0    metroNIDAZOLE (FLAGYL) 500 MG tablet Take 1 tablet by mouth 3 times daily for 14 days 42 tablet 0    acetaminophen (TYLENOL) 325 MG tablet Take 325 mg by mouth      levothyroxine (SYNTHROID) 25 MCG tablet Take 25 mcg by mouth every morning      mirtazapine (REMERON) 7.5 MG tablet Take 7.5 mg by mouth nightly      oxyCODONE-acetaminophen (PERCOCET) 5-325 MG per tablet Take 1 tablet by mouth every 4 hours as needed for Pain.  Probiotic Product (PROBIOTIC-10) CAPS Take 1 tablet by mouth 3 times daily      ascorbic acid (VITAMIN C) 500 MG tablet Take 500 mg by mouth daily      ondansetron (ZOFRAN) 4 MG tablet Take 4 mg by mouth every 4 hours as needed for Nausea or Vomiting       No current facility-administered medications on file prior to encounter.         REVIEW OF SYSTEMS    Denied fever ,chills , N/V/D     Objective:      /75   Pulse 82   Temp 98.3 °F (36.8 °C) (Tympanic)   Resp 20   Ht 5' 7\" (1.702 m)   Wt 190 lb (86.2 kg)   BMI 29.76 kg/m²     Wt Readings from Last 3 Encounters:   03/12/18 190 lb (86.2 kg)   02/26/18 190 lb (86.2 kg)   02/21/18 190 lb (86.2 kg) PHYSICAL EXAM  AO3,NAD         Assessment:      Patient Active Problem List   Diagnosis Code    Open abdominal wall wound S31.109A    Delayed surgical wound healing T81.89XA    Diverticulitis of colon with perforation K57.20    Malnutrition ([address] E46    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 Performed by: Joanna Tavares MD    Consent obtained:  Yes    Time out taken:  Yes    Pain Control: Anesthetic  Anesthetic: 4% Lidocaine Liquid Topical       Debridement:Excisional Debridement    Using curette the wound(s)/ulcer(s) was/were sharply debrided down through and including the removal of subcutaneous tissue. Devitalized Tissue Debrided:  fibrin, biofilm and slough    Pre Debridement Measurements:  Are located in the [place]  Documentation Flow Sheet    Wound/Ulcer #: 1    Post Debridement Measurements:  Wound/Ulcer Descriptions are Pre Debridement except measurements:    Wound 01/03/18 #1 abdomen,surgery dec. 2017 (Active)   Wound Image   2/26/2018 12:09 PM   Wound Type Incision 3/12/2018 11:08 AM   Wound Other 1/3/2018  8:40 AM   Dressing Status Old drainage 3/12/2018 11:08 AM   Dressing Changed Changed/New 3/12/2018 11:08 AM   Wound Cleansed Rinsed/Irrigated with saline 3/12/2018 11:08 AM   Wound Length (cm) 3.5 cm 3/12/2018 12:08 PM   Wound Width (cm) 0.7 cm 3/12/2018 12:08 PM   Wound Depth (cm)  0.1 3/12/2018 12:08 PM   Calculated Wound Size (cm^2) (l*w) 2.45 cm^2 3/12/2018 12:08 PM   Change in Wound Size % (l*w) 98.65 3/12/2018 12:08 PM   Wound Assessment Clean; Intact; Red 3/12/2018 11:08 AM   Drainage Amount Small 3/12/2018 11:08 AM   Drainage Description Serosanguinous 3/12/2018 11:08 AM   Odor None 3/12/2018 11:08 AM   Margins Defined edges 3/12/2018 11:08 AM   Exposed structure Muscle 1/22/2018 10:10 AM

## 2018-03-21 ENCOUNTER — HOSPITAL ENCOUNTER (OUTPATIENT)
Dept: WOUND CARE | Age: 58
Discharge: HOME | End: 2018-03-21
Payer: COMMERCIAL

## 2018-03-26 ENCOUNTER — HOSPITAL ENCOUNTER (OUTPATIENT)
Dept: WOUND CARE | Age: 58
Discharge: HOME | End: 2018-03-26
Payer: MEDICAID

## 2018-03-26 VITALS
DIASTOLIC BLOOD PRESSURE: 82 MMHG | RESPIRATION RATE: 18 BRPM | SYSTOLIC BLOOD PRESSURE: 116 MMHG | TEMPERATURE: 98.6 F | HEIGHT: 67 IN | HEART RATE: 79 BPM | WEIGHT: 190 LBS | BODY MASS INDEX: 29.82 KG/M2

## 2018-03-26 PROCEDURE — 99211 OFF/OP EST MAY X REQ PHY/QHP: CPT

## 2018-03-26 PROCEDURE — 99214 OFFICE O/P EST MOD 30 MIN: CPT | Performed by: INTERNAL MEDICINE

## 2018-03-26 PROCEDURE — 6370000000 HC RX 637 (ALT 250 FOR IP): Performed by: INTERNAL MEDICINE

## 2018-03-26 RX ORDER — LIDOCAINE HYDROCHLORIDE 40 MG/ML
SOLUTION TOPICAL ONCE
Status: COMPLETED | OUTPATIENT
Start: 2018-03-26 | End: 2018-03-26

## 2018-03-26 RX ORDER — FLUCONAZOLE 150 MG/1
150 TABLET ORAL ONCE
Qty: 1 TABLET | Refills: 0 | Status: SHIPPED | OUTPATIENT
Start: 2018-03-26 | End: 2018-03-26

## 2018-03-26 RX ADMIN — LIDOCAINE HYDROCHLORIDE 5 ML: 40 SOLUTION TOPICAL at 11:58

## 2018-03-26 ASSESSMENT — PAIN SCALES - GENERAL: PAINLEVEL_OUTOF10: 0

## 2018-03-26 NOTE — PROGRESS NOTES
Colocutaneous fistula K63.2    Abscess L02.91            Wound 01/03/18 #1 abdomen,surgery dec. 2017 (Active)   Wound Image   3/26/2018 10:56 AM   Wound Type Incision 3/12/2018 11:08 AM   Wound Other 1/3/2018  8:40 AM   Dressing Status Old drainage 3/12/2018 11:08 AM   Dressing Changed Changed/New 3/12/2018 11:08 AM   Wound Cleansed Rinsed/Irrigated with saline 3/12/2018 11:08 AM   Wound Length (cm) 0 cm 3/26/2018 10:56 AM   Wound Width (cm) 0 cm 3/26/2018 10:56 AM   Wound Depth (cm)  0 3/26/2018 10:56 AM   Calculated Wound Size (cm^2) (l*w) 0 cm^2 3/26/2018 10:56 AM   Change in Wound Size % (l*w) 100 3/26/2018 10:56 AM   Wound Assessment Clean; Intact; Red 3/12/2018 11:08 AM   Drainage Amount Small 3/12/2018 11:08 AM   Drainage Description Serosanguinous 3/12/2018 11:08 AM   Odor None 3/12/2018 11:08 AM   Margins Defined edges 3/12/2018 11:08 AM   Exposed structure Muscle 1/22/2018 10:10 AM   Elyssa-wound Assessment Calloused;Clean;Dry 3/12/2018 11:08 AM   [place]%Wound Bed 0 3/12/2018 11:08 AM   Red%Wound Bed 100 3/12/2018 11:08 AM   Yellow%Wound Bed 0 3/12/2018 11:08 AM   Debridement per physician Subcutaneous 1/22/2018 10:25 AM   Time out Yes 3/12/2018 12:08 PM   Procedural Pain 0 1/22/2018 10:25 AM   Post procedural Pain 0 1/22/2018 10:25 AM   Number of days: 82     Open abdominal wall wound healed       Delayed surgical wound healing    Possible residual Diverticulitis     Malnutrition ([address]    Colocutaneous fistula                 Plan:     Treatment Note please see attached Discharge Instructions    Written patient dismissal instructions given to patient and signed by patient or POA. Discharge Instructions                  [facility]                                 Visit Zee Instructions / Physician Orders  DATE: 3/26/2018     Home Care:  [facility]     SUPPLIES ORDERED THRU:     Wound Location: Mid Abdomen     Cleanse with: normal saline, may shower with soap and water      Dressing Orders:, DRAIN SPONGE TO DRAINAGE BAG     Frequency:                   Additional Orders: Increase protein to diet (meat, cheese, eggs, fish, peanut butter, nuts and beans)  Multivitamin daily. FINISH KEFLEX AND FLAGYL  TAKE DIFLUCAN AS ORDERED ([place])   Please empty and record drain bag drainage. Patient needs to bring in the list of measurements at every appointment.  [address]     Your next appointment with [facility] is as needed. MAKE AN APPOINTMENT WITH DR. GOSS FOR FOLLOW UP.      (Please note your next appointment above and if you are unable to keep, kindly give a 24 hour notice.  Thank you.)     If you experience any of the following, please call the [facility] during business hours:  [phone]     * Increase in Pain  * Temperature over 101  * Increase in drainage from your wound  * Drainage with a foul odor  * Bleeding  * Increase in swelling  * Need for compression bandage changes due to slippage, breakthrough drainage.     If you need [facility] attention [facility] please contact your PCP or go to the nearest emergency room.     Patient Signature:________________________________________________Date:_______  Electronically signed by Yesi Brown RN on 3/26/2018 at 11:04 AM  Electronically signed by Galne Garrett MD on 3/26/2018 at 11:13 AM        Electronically signed by Galen Garrett MD on 3/26/2018 at 1:01 PM

## 2018-04-11 ENCOUNTER — HOSPITAL ENCOUNTER (OUTPATIENT)
Dept: CT IMAGING | Age: 58
Discharge: HOME | End: 2018-04-13
Payer: MEDICAID

## 2018-04-11 DIAGNOSIS — K57.20 DIVERTICULITIS OF LARGE INTESTINE WITH PERFORATION AND ABSCESS, UNSPECIFIED BLEEDING STATUS: ICD-10-CM

## 2018-04-11 DIAGNOSIS — K57.20 ABSCESS OF SIGMOID COLON DUE TO DIVERTICULITIS: ICD-10-CM

## 2018-04-11 PROCEDURE — 2580000003 HC RX 258: Performed by: SURGERY

## 2018-04-11 PROCEDURE — 74177 CT ABD & PELVIS W/CONTRAST: CPT

## 2018-04-11 PROCEDURE — 6360000004 HC RX CONTRAST MEDICATION: Performed by: SURGERY

## 2018-04-11 RX ORDER — 0.9 % SODIUM CHLORIDE 0.9 %
100 INTRAVENOUS SOLUTION INTRAVENOUS ONCE
Status: COMPLETED | OUTPATIENT
Start: 2018-04-11 | End: 2018-04-11

## 2018-04-11 RX ORDER — SODIUM CHLORIDE 0.9 % (FLUSH) 0.9 %
10 SYRINGE (ML) INJECTION PRN
Status: DISCONTINUED | OUTPATIENT
Start: 2018-04-11 | End: 2018-04-14 | Stop reason: HOSPADM

## 2018-04-11 RX ADMIN — IOPAMIDOL 75 ML: 755 INJECTION, SOLUTION INTRAVENOUS at 11:19

## 2018-04-11 RX ADMIN — IOHEXOL 100 ML: 240 INJECTION, SOLUTION INTRATHECAL; INTRAVASCULAR; INTRAVENOUS; ORAL at 11:19

## 2018-04-11 RX ADMIN — SODIUM CHLORIDE 100 ML: 9 INJECTION, SOLUTION INTRAVENOUS at 11:19

## 2018-04-11 RX ADMIN — Medication 10 ML: at 11:19

## 2020-02-18 ENCOUNTER — HOSPITAL ENCOUNTER (INPATIENT)
Age: 60
LOS: 2 days | Discharge: HOME | DRG: 720 | End: 2020-02-20
Attending: EMERGENCY MEDICINE | Admitting: INTERNAL MEDICINE
Payer: MEDICAID

## 2020-02-18 ENCOUNTER — APPOINTMENT (OUTPATIENT)
Dept: CT IMAGING | Age: 60
DRG: 720 | End: 2020-02-18
Payer: MEDICAID

## 2020-02-18 PROBLEM — K57.92 DIVERTICULITIS: Status: ACTIVE | Noted: 2020-02-18

## 2020-02-18 LAB
-: NORMAL
ABSOLUTE EOS #: 0.2 K/UL (ref 0–0.4)
ABSOLUTE IMMATURE GRANULOCYTE: ABNORMAL K/UL (ref 0–0.3)
ABSOLUTE LYMPH #: 1 K/UL (ref 1–4.8)
ABSOLUTE MONO #: 0.3 K/UL (ref 0.1–1.3)
ALBUMIN SERPL-MCNC: 4.1 G/DL (ref 3.5–5.2)
ALBUMIN/GLOBULIN RATIO: ABNORMAL (ref 1–2.5)
ALP BLD-CCNC: 152 U/L (ref 35–104)
ALT SERPL-CCNC: 15 U/L (ref 5–33)
AMORPHOUS: NORMAL
ANION GAP SERPL CALCULATED.3IONS-SCNC: 15 MMOL/L (ref 9–17)
AST SERPL-CCNC: 17 U/L
BACTERIA: NORMAL
BASOPHILS # BLD: 1 % (ref 0–2)
BASOPHILS ABSOLUTE: 0.1 K/UL (ref 0–0.2)
BILIRUB SERPL-MCNC: 0.28 MG/DL (ref 0.3–1.2)
BILIRUBIN URINE: NEGATIVE
BUN BLDV-MCNC: 11 MG/DL (ref 6–20)
BUN/CREAT BLD: ABNORMAL (ref 9–20)
CALCIUM SERPL-MCNC: 9.5 MG/DL (ref 8.6–10.4)
CASTS UA: NORMAL /LPF
CHLORIDE BLD-SCNC: 103 MMOL/L (ref 98–107)
CO2: 21 MMOL/L (ref 20–31)
COLOR: YELLOW
COMMENT UA: ABNORMAL
CREAT SERPL-MCNC: 0.68 MG/DL (ref 0.5–0.9)
CRYSTALS, UA: NORMAL /HPF
DIFFERENTIAL TYPE: ABNORMAL
EOSINOPHILS RELATIVE PERCENT: 1 % (ref 0–4)
EPITHELIAL CELLS UA: NORMAL /HPF
GFR AFRICAN AMERICAN: >60 ML/MIN
GFR NON-AFRICAN AMERICAN: >60 ML/MIN
GFR SERPL CREATININE-BSD FRML MDRD: ABNORMAL ML/MIN/{1.73_M2}
GFR SERPL CREATININE-BSD FRML MDRD: ABNORMAL ML/MIN/{1.73_M2}
GLUCOSE BLD-MCNC: 104 MG/DL (ref 70–99)
GLUCOSE URINE: NEGATIVE
HCT VFR BLD CALC: 41.8 % (ref 36–46)
HEMOGLOBIN: 13.3 G/DL (ref 12–16)
IMMATURE GRANULOCYTES: ABNORMAL %
KETONES, URINE: NEGATIVE
LEUKOCYTE ESTERASE, URINE: NEGATIVE
LIPASE: 26 U/L (ref 13–60)
LYMPHOCYTES # BLD: 8 % (ref 24–44)
MCH RBC QN AUTO: 25.5 PG (ref 26–34)
MCHC RBC AUTO-ENTMCNC: 31.8 G/DL (ref 31–37)
MCV RBC AUTO: 80 FL (ref 80–100)
MONOCYTES # BLD: 3 % (ref 1–7)
MUCUS: NORMAL
NITRITE, URINE: NEGATIVE
NRBC AUTOMATED: ABNORMAL PER 100 WBC
OTHER OBSERVATIONS UA: NORMAL
PDW BLD-RTO: 17.2 % (ref 11.5–14.9)
PH UA: 6 (ref 5–8)
PLATELET # BLD: 273 K/UL (ref 150–450)
PLATELET ESTIMATE: ABNORMAL
PMV BLD AUTO: 8.8 FL (ref 6–12)
POTASSIUM SERPL-SCNC: 4.1 MMOL/L (ref 3.7–5.3)
PROTEIN UA: NEGATIVE
RBC # BLD: 5.22 M/UL (ref 4–5.2)
RBC # BLD: ABNORMAL 10*6/UL
RBC UA: NORMAL /HPF
RENAL EPITHELIAL, UA: NORMAL /HPF
SEG NEUTROPHILS: 87 % (ref 36–66)
SEGMENTED NEUTROPHILS ABSOLUTE COUNT: 10.3 K/UL (ref 1.3–9.1)
SODIUM BLD-SCNC: 139 MMOL/L (ref 135–144)
SPECIFIC GRAVITY UA: 1 (ref 1–1.03)
TOTAL PROTEIN: 7.8 G/DL (ref 6.4–8.3)
TRICHOMONAS: NORMAL
TURBIDITY: CLEAR
URINE HGB: ABNORMAL
UROBILINOGEN, URINE: NORMAL
WBC # BLD: 11.8 K/UL (ref 3.5–11)
WBC # BLD: ABNORMAL 10*3/UL
WBC UA: NORMAL /HPF
YEAST: NORMAL

## 2020-02-18 PROCEDURE — 2580000003 HC RX 258: Performed by: EMERGENCY MEDICINE

## 2020-02-18 PROCEDURE — 96374 THER/PROPH/DIAG INJ IV PUSH: CPT

## 2020-02-18 PROCEDURE — 99223 1ST HOSP IP/OBS HIGH 75: CPT | Performed by: INTERNAL MEDICINE

## 2020-02-18 PROCEDURE — 83690 ASSAY OF LIPASE: CPT

## 2020-02-18 PROCEDURE — 85025 COMPLETE CBC W/AUTO DIFF WBC: CPT

## 2020-02-18 PROCEDURE — 36415 COLL VENOUS BLD VENIPUNCTURE: CPT

## 2020-02-18 PROCEDURE — 81001 URINALYSIS AUTO W/SCOPE: CPT

## 2020-02-18 PROCEDURE — 74177 CT ABD & PELVIS W/CONTRAST: CPT

## 2020-02-18 PROCEDURE — 2060000000 HC ICU INTERMEDIATE R&B

## 2020-02-18 PROCEDURE — 99285 EMERGENCY DEPT VISIT HI MDM: CPT

## 2020-02-18 PROCEDURE — 6360000004 HC RX CONTRAST MEDICATION: Performed by: EMERGENCY MEDICINE

## 2020-02-18 PROCEDURE — 96375 TX/PRO/DX INJ NEW DRUG ADDON: CPT

## 2020-02-18 PROCEDURE — 6370000000 HC RX 637 (ALT 250 FOR IP): Performed by: NURSE PRACTITIONER

## 2020-02-18 PROCEDURE — 6360000002 HC RX W HCPCS: Performed by: EMERGENCY MEDICINE

## 2020-02-18 PROCEDURE — 96376 TX/PRO/DX INJ SAME DRUG ADON: CPT

## 2020-02-18 PROCEDURE — 80053 COMPREHEN METABOLIC PANEL: CPT

## 2020-02-18 RX ORDER — ONDANSETRON 2 MG/ML
4 INJECTION INTRAMUSCULAR; INTRAVENOUS EVERY 8 HOURS PRN
Status: DISCONTINUED | OUTPATIENT
Start: 2020-02-18 | End: 2020-02-20 | Stop reason: HOSPADM

## 2020-02-18 RX ORDER — ASCORBIC ACID 500 MG
500 TABLET ORAL DAILY
COMMUNITY

## 2020-02-18 RX ORDER — ECHINACEA 400 MG
1 CAPSULE ORAL DAILY
COMMUNITY

## 2020-02-18 RX ORDER — SODIUM CHLORIDE 9 MG/ML
INJECTION, SOLUTION INTRAVENOUS CONTINUOUS
Status: DISCONTINUED | OUTPATIENT
Start: 2020-02-18 | End: 2020-02-20 | Stop reason: HOSPADM

## 2020-02-18 RX ORDER — 0.9 % SODIUM CHLORIDE 0.9 %
80 INTRAVENOUS SOLUTION INTRAVENOUS ONCE
Status: COMPLETED | OUTPATIENT
Start: 2020-02-18 | End: 2020-02-18

## 2020-02-18 RX ORDER — ZINC GLUCONATE 50 MG
50 TABLET ORAL DAILY
COMMUNITY

## 2020-02-18 RX ORDER — MORPHINE SULFATE 2 MG/ML
2 INJECTION, SOLUTION INTRAMUSCULAR; INTRAVENOUS
Status: DISCONTINUED | OUTPATIENT
Start: 2020-02-18 | End: 2020-02-20 | Stop reason: HOSPADM

## 2020-02-18 RX ORDER — LEVOTHYROXINE SODIUM 0.03 MG/1
25 TABLET ORAL EVERY MORNING
Status: DISCONTINUED | OUTPATIENT
Start: 2020-02-19 | End: 2020-02-19

## 2020-02-18 RX ORDER — ONDANSETRON 2 MG/ML
4 INJECTION INTRAMUSCULAR; INTRAVENOUS ONCE
Status: COMPLETED | OUTPATIENT
Start: 2020-02-18 | End: 2020-02-18

## 2020-02-18 RX ORDER — SODIUM CHLORIDE 0.9 % (FLUSH) 0.9 %
10 SYRINGE (ML) INJECTION ONCE
Status: COMPLETED | OUTPATIENT
Start: 2020-02-18 | End: 2020-02-18

## 2020-02-18 RX ORDER — SODIUM CHLORIDE 0.9 % (FLUSH) 0.9 %
10 SYRINGE (ML) INJECTION EVERY 12 HOURS SCHEDULED
Status: DISCONTINUED | OUTPATIENT
Start: 2020-02-18 | End: 2020-02-20 | Stop reason: HOSPADM

## 2020-02-18 RX ORDER — ZINC GLUCONATE 50 MG
50 TABLET ORAL DAILY
Status: DISCONTINUED | OUTPATIENT
Start: 2020-02-18 | End: 2020-02-18 | Stop reason: RX

## 2020-02-18 RX ORDER — M-VIT,TX,IRON,MINS/CALC/FOLIC 27MG-0.4MG
1 TABLET ORAL DAILY
Status: DISCONTINUED | OUTPATIENT
Start: 2020-02-18 | End: 2020-02-20 | Stop reason: HOSPADM

## 2020-02-18 RX ORDER — MORPHINE SULFATE 4 MG/ML
4 INJECTION, SOLUTION INTRAMUSCULAR; INTRAVENOUS
Status: DISCONTINUED | OUTPATIENT
Start: 2020-02-18 | End: 2020-02-20 | Stop reason: HOSPADM

## 2020-02-18 RX ORDER — CYCLOBENZAPRINE HCL 5 MG
5 TABLET ORAL 3 TIMES DAILY PRN
COMMUNITY
End: 2021-12-28

## 2020-02-18 RX ORDER — M-VIT,TX,IRON,MINS/CALC/FOLIC 27MG-0.4MG
1 TABLET ORAL DAILY
COMMUNITY

## 2020-02-18 RX ORDER — MORPHINE SULFATE 4 MG/ML
4 INJECTION, SOLUTION INTRAMUSCULAR; INTRAVENOUS ONCE
Status: COMPLETED | OUTPATIENT
Start: 2020-02-18 | End: 2020-02-18

## 2020-02-18 RX ORDER — 0.9 % SODIUM CHLORIDE 0.9 %
1000 INTRAVENOUS SOLUTION INTRAVENOUS ONCE
Status: COMPLETED | OUTPATIENT
Start: 2020-02-18 | End: 2020-02-18

## 2020-02-18 RX ORDER — SODIUM CHLORIDE 0.9 % (FLUSH) 0.9 %
10 SYRINGE (ML) INJECTION PRN
Status: DISCONTINUED | OUTPATIENT
Start: 2020-02-18 | End: 2020-02-20 | Stop reason: HOSPADM

## 2020-02-18 RX ORDER — ASCORBIC ACID 500 MG
500 TABLET ORAL DAILY
Status: DISCONTINUED | OUTPATIENT
Start: 2020-02-19 | End: 2020-02-20 | Stop reason: HOSPADM

## 2020-02-18 RX ORDER — ACETAMINOPHEN 325 MG/1
650 TABLET ORAL EVERY 4 HOURS PRN
Status: DISCONTINUED | OUTPATIENT
Start: 2020-02-18 | End: 2020-02-20 | Stop reason: HOSPADM

## 2020-02-18 RX ORDER — ACETAMINOPHEN 325 MG/1
650 TABLET ORAL EVERY 6 HOURS PRN
COMMUNITY

## 2020-02-18 RX ORDER — CYCLOBENZAPRINE HCL 10 MG
5 TABLET ORAL 3 TIMES DAILY PRN
Status: DISCONTINUED | OUTPATIENT
Start: 2020-02-18 | End: 2020-02-20 | Stop reason: HOSPADM

## 2020-02-18 RX ORDER — MAGNESIUM SULFATE 1 G/100ML
1 INJECTION INTRAVENOUS PRN
Status: DISCONTINUED | OUTPATIENT
Start: 2020-02-18 | End: 2020-02-20 | Stop reason: HOSPADM

## 2020-02-18 RX ADMIN — PIPERACILLIN AND TAZOBACTAM 4.5 G: 4; .5 INJECTION, POWDER, LYOPHILIZED, FOR SOLUTION INTRAVENOUS; PARENTERAL at 18:43

## 2020-02-18 RX ADMIN — Medication 10 ML: at 17:15

## 2020-02-18 RX ADMIN — MORPHINE SULFATE 4 MG: 4 INJECTION, SOLUTION INTRAMUSCULAR; INTRAVENOUS at 17:59

## 2020-02-18 RX ADMIN — ONDANSETRON 4 MG: 2 INJECTION INTRAMUSCULAR; INTRAVENOUS at 17:59

## 2020-02-18 RX ADMIN — SODIUM CHLORIDE: 9 INJECTION, SOLUTION INTRAVENOUS at 21:22

## 2020-02-18 RX ADMIN — SODIUM CHLORIDE 80 ML: 9 INJECTION, SOLUTION INTRAVENOUS at 17:15

## 2020-02-18 RX ADMIN — ACETAMINOPHEN 650 MG: 325 TABLET, FILM COATED ORAL at 20:58

## 2020-02-18 RX ADMIN — ONDANSETRON 4 MG: 2 INJECTION INTRAMUSCULAR; INTRAVENOUS at 16:00

## 2020-02-18 RX ADMIN — IOVERSOL 75 ML: 741 INJECTION INTRA-ARTERIAL; INTRAVENOUS at 17:15

## 2020-02-18 RX ADMIN — SODIUM CHLORIDE 1000 ML: 9 INJECTION, SOLUTION INTRAVENOUS at 16:22

## 2020-02-18 ASSESSMENT — ENCOUNTER SYMPTOMS
NAUSEA: 1
SHORTNESS OF BREATH: 0
ABDOMINAL PAIN: 1
BACK PAIN: 1
BLOOD IN STOOL: 0
ABDOMINAL DISTENTION: 1
VOMITING: 0

## 2020-02-18 ASSESSMENT — PAIN SCALES - GENERAL
PAINLEVEL_OUTOF10: 0
PAINLEVEL_OUTOF10: 1
PAINLEVEL_OUTOF10: 2
PAINLEVEL_OUTOF10: 4
PAINLEVEL_OUTOF10: 2
PAINLEVEL_OUTOF10: 1

## 2020-02-18 ASSESSMENT — PAIN DESCRIPTION - LOCATION: LOCATION: ABDOMEN

## 2020-02-18 ASSESSMENT — PAIN DESCRIPTION - PAIN TYPE
TYPE: OTHER (COMMENT)
TYPE: ACUTE PAIN
TYPE: OTHER (COMMENT)

## 2020-02-18 NOTE — ED PROVIDER NOTES
[address]  Emergency Department Encounter  Emergency Medicine Resident     Pt Name: Ezequiel Dailey  MRN: [id number]  Kristy 1960  Date of evaluation: 2/18/20  PCP:  [address]       Chief Complaint   Patient presents with    Abdominal Pain     Intermittent x3 weeks    Wound Check     Near stoma, x1 month, went away and came back yesterday    Nausea    Bloated       HISTORY OF PRESENT ILLNESS  (Location/Symptom, Timing/Onset, Context/Setting, Quality, Duration, Modifying Factors, Severity.)    Ezequiel Dailey is a 61 y.o. female who presents with intermittent abdominal pain for the past 3 weeks. Patient states that she is also felt nauseated and bloated with abdominal distention. Patient has a history of perforated diverticulitis and had subsequent postoperative infection that resulted in an ostomy. She has had this present for the past 2 years. She is also status post appendectomy and cholecystectomy. She states that for the past month she has had this small area of ulceration about 1 inch away from her stoma. She stopped wearing colostomy bags and making sure that the ulcer site was covered in antibacterial ointment and kept clean and dry. She saw her primary care provider for this and was placed on a course of oral antibiotics which she completed. She states that the area eventually healed and she started to wear ostomy bags again approximately 1 week ago when she noticed that the ulceration returned a few days ago. She also made of note that she has had increased urination, does have a history of urgency incontinence. She denies any known fevers, body aches, sweats, chills, chest pain, shortness of breath, blood in her urine, blood in her stool. PAST MEDICAL / SURGICAL / SOCIAL / FAMILY HISTORY    has a past medical history of Blood circulation, collateral and GERD (gastroesophageal reflux disease).      has a past surgical history that includes Appendectomy; Abdomen surgery; hernia repair; Small intestine surgery; and Cholecystectomy.     Social History     Socioeconomic History    Marital status: Single     Spouse name: Not on file    Number of children: Not on file    Years of education: Not on file    Highest education level: Not on file   Occupational History    Occupation:    Social Needs    Financial resource strain: Not on file    Food insecurity:     Worry: Not on file     Inability: Not on file    Transportation needs:     Medical: Not on file     Non-medical: Not on file   Tobacco Use    Smoking status: Light Tobacco Smoker    Smokeless tobacco: Never Used    Tobacco comment: 4 cigarettes a day   Substance and Sexual Activity    Alcohol use: No    Drug use: No    Sexual activity: Not on file   Lifestyle    Physical activity:     Days per week: Not on file     Minutes per session: Not on file    Stress: Not on file   Relationships    Social connections:     Talks on phone: Not on file     Gets together: Not on file     Attends Sabianism service: Not on file     Active member of club or organization: Not on file     Attends meetings of clubs or organizations: Not on file     Relationship status: Not on file    Intimate partner violence:     Fear of current or ex partner: Not on file     Emotionally abused: Not on file     Physically abused: Not on file     Forced sexual activity: Not on file   Other Topics Concern    Not on file   Social History Narrative    Not on file       Family History   Problem Relation Age of Onset    Cancer Mother     Coronary Art Dis Mother     Diabetes Mother     Heart Disease Mother     Parkinsonism Mother     Coronary Art Dis Father     Heart Disease Father     Diabetes Brother     Heart Disease Brother     High Cholesterol Brother     High Blood Pressure Brother     Cancer Maternal Aunt     Cancer Maternal Grandmother     Diabetes Brother     High Cholesterol Brother     High Blood Pressure Brother        Allergies:    Codeine    Home Medications:  Prior to Admission medications    Medication Sig Start Date End Date Taking? Authorizing Provider   cyclobenzaprine (FLEXERIL) 10 MG tablet Take 10 mg by mouth 3 times daily as needed for Muscle spasms   Yes Historical Provider, MD   acetaminophen (TYLENOL) 325 MG tablet Take 650 mg by mouth every 6 hours as needed for Pain   Yes Historical Provider, MD   Multiple Vitamins-Minerals (THERAPEUTIC MULTIVITAMIN-MINERALS) tablet Take 1 tablet by mouth daily   Yes Historical Provider, MD   vitamin C (ASCORBIC ACID) 500 MG tablet Take 500 mg by mouth daily   Yes Historical Provider, MD   zinc gluconate 50 MG tablet Take 50 mg by mouth daily   Yes Historical Provider, MD   Black Elderberry,Berry-Flower, 575 MG CAPS Take 1 capsule by mouth daily   Yes Historical Provider, MD       REVIEW OF SYSTEMS    (2-9 systems for level 4, 10 or more for level 5)    Review of Systems   Constitutional: Negative for chills, diaphoresis and fever. Respiratory: Negative for shortness of breath. Cardiovascular: Negative for chest pain. Gastrointestinal: Positive for abdominal distention, abdominal pain and nausea. Negative for blood in stool and vomiting. Endocrine: Positive for polyuria. Genitourinary: Positive for frequency. Negative for decreased urine volume, difficulty urinating, dysuria, flank pain, hematuria and urgency. Musculoskeletal: Positive for back pain and myalgias. Skin: Positive for wound. Neurological: Negative for dizziness and light-headedness. PHYSICAL EXAM   (up to 7 for level 4, 8 or more for level 5)    VITALS:   Vitals:    02/18/20 1516 02/18/20 1757 02/18/20 1800   BP: (!) 142/95 (!) 140/93 (!) 153/97   Pulse: 120 136 122   Resp: 21 16 15   Temp: 97.8 °F (36.6 °C)     SpO2: 97% 93% 94%   Weight: 240 lb (108.9 kg)     Height: 5' 7.5\" (1.715 m)         Physical Exam  Vitals signs and nursing note reviewed. Constitutional:       General: She is not in acute distress. Appearance: She is well-developed. She is obese. She is not diaphoretic. HENT:      Head: Normocephalic and atraumatic. Mouth/Throat:      Mouth: Mucous membranes are moist.   Eyes:      Conjunctiva/sclera: Conjunctivae normal.   Neck:      Musculoskeletal: Normal range of motion. Cardiovascular:      Rate and Rhythm: Normal rate and regular rhythm. Pulses: Normal pulses. Heart sounds: Normal heart sounds. No murmur. Pulmonary:      Effort: Pulmonary effort is normal. No respiratory distress. Breath sounds: Normal breath sounds. No wheezing, rhonchi or rales. Abdominal:      General: Abdomen is protuberant. A surgical scar is present. The ostomy site is clean. Bowel sounds are normal. There is no distension. Palpations: Abdomen is soft. Tenderness: There is generalized abdominal tenderness. Musculoskeletal: Normal range of motion. Skin:     General: Skin is warm and dry. Coloration: Skin is not pale. Findings: Erythema and lesion present. Neurological:      General: No focal deficit present. Mental Status: She is alert. Psychiatric:         Mood and Affect: Affect is tearful.          Behavior: Behavior normal.         DIFFERENTIAL  DIAGNOSIS   PLAN (LABS / IMAGING / EKG):  Orders Placed This Encounter   Procedures    CT ABDOMEN PELVIS W IV CONTRAST    CBC Auto Differential    Comprehensive Metabolic Panel    Lipase    Urinalysis Reflex to Culture    Microscopic Urinalysis    Inpatient consult to General Surgery    Inpatient consult to Internal Medicine    Insert peripheral IV    PATIENT STATUS (FROM ED OR OR/PROCEDURAL) Inpatient       MEDICATIONS ORDERED:  Orders Placed This Encounter   Medications    0.9 % sodium chloride bolus    ondansetron (ZOFRAN) injection 4 mg    morphine sulfate (PF) injection 4 mg    sodium chloride flush 0.9 % injection 10 mL    0.9 % sodium Urine NEGATIVE   Leukocyte Esterase, Urine NEGATIVE   Urinalysis Comments NOT REPORTED [AM]   1640 Comprehensive Metabolic Panel(!):    Glucose 104(!)   BUN 11   Creatinine 0.68   Bun/Cre Ratio NOT REPORTED   Calcium 9.5   Sodium 139   Potassium 4.1   Chloride 103   CO2 21   Anion Gap 15   Alk Phos 152(!)   ALT 15   AST 17   Bilirubin 0.28(!)   Total Protein 7.8   Albumin 4.1   Albumin/Globulin Ratio NOT REPORTED   GFR Non- >60   GFR  >60   GFR Comment        GFR Staging NOT REPORTED [AM]   1640 Lipase:    Lipase 26 [AM]   1702 Microscopic Urinalysis:    -        WBC, UA 0 TO 2   RBC, UA 2 TO 5   Casts UA NOT REPORTED   Crystals NOT REPORTED   Epi Cells 0 TO 2   Renal Epithelial, Urine NOT REPORTED   Bacteria, UA NOT REPORTED   Mucus, UA NOT REPORTED   Trichomonas, UA NOT REPORTED   Amorphous, UA NOT REPORTED   Other Observations UA NOT REPORTED   Yeast, Urine NOT REPORTED [AM]   1726 Checked in on patient, updated on lab results. Awaiting results of CT scan. Declines of IV pain medications at this time. Has never seen a wound/ostomy specialist in the past     [AM]   36 Patient's primary surgeon Dr. Elisa Flores consulted   [facility] [AM]   5121 Admit to medicine. Zosyn. Keep NPO. Maintenance fluids at 125/hr.    [AM]   0011 Spoke to Dr. Priscilla Espinosa. Accepted     [AM]      ED Course User Index  [AM] Vivian Carbone DO       MDM  Number of Diagnoses or Management Options  Bloating:   Generalized abdominal pain:   Nausea:   Skin ulcer, limited to breakdown of skin [facility]):   Diagnosis management comments: Patient presented emergency department with waxing and waning abdominal pain associated with nausea, bloating. Patient also experiencing difficulty with skin ulcerations near the site of her stoma. Patient uncomfortable on exam.  Stoma healthy appearing. Small area of stage II skin ulcer.   Patient initially hypertensive and tachycardic upon initial evaluation, however

## 2020-02-19 ENCOUNTER — APPOINTMENT (OUTPATIENT)
Dept: CT IMAGING | Age: 60
DRG: 720 | End: 2020-02-19
Payer: MEDICAID

## 2020-02-19 PROBLEM — Z93.3 S/P COLOSTOMY (HCC): Status: ACTIVE | Noted: 2020-02-19

## 2020-02-19 PROBLEM — L98.499 SKIN ULCER (HCC): Status: ACTIVE | Noted: 2020-02-19

## 2020-02-19 PROBLEM — Z72.0 TOBACCO USE: Status: ACTIVE | Noted: 2020-02-19

## 2020-02-19 PROBLEM — Z87.19 HISTORY OF DIVERTICULITIS OF COLON: Status: ACTIVE | Noted: 2018-01-03

## 2020-02-19 PROBLEM — E66.01 MORBID OBESITY (HCC): Status: ACTIVE | Noted: 2020-02-19

## 2020-02-19 LAB
ABSOLUTE EOS #: 0 K/UL (ref 0–0.4)
ABSOLUTE IMMATURE GRANULOCYTE: ABNORMAL K/UL (ref 0–0.3)
ABSOLUTE LYMPH #: 1.1 K/UL (ref 1–4.8)
ABSOLUTE MONO #: 0.7 K/UL (ref 0.1–1.3)
ANION GAP SERPL CALCULATED.3IONS-SCNC: 11 MMOL/L (ref 9–17)
BASOPHILS # BLD: 0 % (ref 0–2)
BASOPHILS ABSOLUTE: 0.1 K/UL (ref 0–0.2)
BUN BLDV-MCNC: 10 MG/DL (ref 6–20)
BUN/CREAT BLD: ABNORMAL (ref 9–20)
CALCIUM SERPL-MCNC: 8.3 MG/DL (ref 8.6–10.4)
CHLORIDE BLD-SCNC: 102 MMOL/L (ref 98–107)
CO2: 24 MMOL/L (ref 20–31)
CREAT SERPL-MCNC: 0.82 MG/DL (ref 0.5–0.9)
DIFFERENTIAL TYPE: ABNORMAL
EOSINOPHILS RELATIVE PERCENT: 0 % (ref 0–4)
GFR AFRICAN AMERICAN: >60 ML/MIN
GFR NON-AFRICAN AMERICAN: >60 ML/MIN
GFR SERPL CREATININE-BSD FRML MDRD: ABNORMAL ML/MIN/{1.73_M2}
GFR SERPL CREATININE-BSD FRML MDRD: ABNORMAL ML/MIN/{1.73_M2}
GLUCOSE BLD-MCNC: 139 MG/DL (ref 70–99)
HCT VFR BLD CALC: 35.2 % (ref 36–46)
HEMOGLOBIN: 11.3 G/DL (ref 12–16)
IMMATURE GRANULOCYTES: ABNORMAL %
INR BLD: 1.1
LYMPHOCYTES # BLD: 8 % (ref 24–44)
MCH RBC QN AUTO: 25.5 PG (ref 26–34)
MCHC RBC AUTO-ENTMCNC: 32 G/DL (ref 31–37)
MCV RBC AUTO: 79.6 FL (ref 80–100)
MONOCYTES # BLD: 5 % (ref 1–7)
NRBC AUTOMATED: ABNORMAL PER 100 WBC
PDW BLD-RTO: 17.5 % (ref 11.5–14.9)
PLATELET # BLD: 245 K/UL (ref 150–450)
PLATELET ESTIMATE: ABNORMAL
PMV BLD AUTO: 8.9 FL (ref 6–12)
POTASSIUM SERPL-SCNC: 4.3 MMOL/L (ref 3.7–5.3)
PROTHROMBIN TIME: 14.3 SEC (ref 11.8–14.6)
RBC # BLD: 4.42 M/UL (ref 4–5.2)
RBC # BLD: ABNORMAL 10*6/UL
SEG NEUTROPHILS: 87 % (ref 36–66)
SEGMENTED NEUTROPHILS ABSOLUTE COUNT: 11.2 K/UL (ref 1.3–9.1)
SODIUM BLD-SCNC: 137 MMOL/L (ref 135–144)
THYROXINE, FREE: 0.74 NG/DL (ref 0.93–1.7)
TSH SERPL DL<=0.05 MIU/L-ACNC: 16.34 MIU/L (ref 0.3–5)
WBC # BLD: 13 K/UL (ref 3.5–11)
WBC # BLD: ABNORMAL 10*3/UL

## 2020-02-19 PROCEDURE — 84443 ASSAY THYROID STIM HORMONE: CPT

## 2020-02-19 PROCEDURE — 36415 COLL VENOUS BLD VENIPUNCTURE: CPT

## 2020-02-19 PROCEDURE — 99213 OFFICE O/P EST LOW 20 MIN: CPT

## 2020-02-19 PROCEDURE — 6360000002 HC RX W HCPCS: Performed by: NURSE PRACTITIONER

## 2020-02-19 PROCEDURE — 85610 PROTHROMBIN TIME: CPT

## 2020-02-19 PROCEDURE — 6360000004 HC RX CONTRAST MEDICATION: Performed by: STUDENT IN AN ORGANIZED HEALTH CARE EDUCATION/TRAINING PROGRAM

## 2020-02-19 PROCEDURE — 80048 BASIC METABOLIC PNL TOTAL CA: CPT

## 2020-02-19 PROCEDURE — 6360000004 HC RX CONTRAST MEDICATION: Performed by: INTERNAL MEDICINE

## 2020-02-19 PROCEDURE — 6360000002 HC RX W HCPCS: Performed by: EMERGENCY MEDICINE

## 2020-02-19 PROCEDURE — 6370000000 HC RX 637 (ALT 250 FOR IP): Performed by: STUDENT IN AN ORGANIZED HEALTH CARE EDUCATION/TRAINING PROGRAM

## 2020-02-19 PROCEDURE — 84439 ASSAY OF FREE THYROXINE: CPT

## 2020-02-19 PROCEDURE — 6370000000 HC RX 637 (ALT 250 FOR IP): Performed by: NURSE PRACTITIONER

## 2020-02-19 PROCEDURE — 2580000003 HC RX 258: Performed by: EMERGENCY MEDICINE

## 2020-02-19 PROCEDURE — 2500000003 HC RX 250 WO HCPCS: Performed by: STUDENT IN AN ORGANIZED HEALTH CARE EDUCATION/TRAINING PROGRAM

## 2020-02-19 PROCEDURE — 85025 COMPLETE CBC W/AUTO DIFF WBC: CPT

## 2020-02-19 PROCEDURE — 87086 URINE CULTURE/COLONY COUNT: CPT

## 2020-02-19 PROCEDURE — 2060000000 HC ICU INTERMEDIATE R&B

## 2020-02-19 PROCEDURE — 2580000003 HC RX 258: Performed by: STUDENT IN AN ORGANIZED HEALTH CARE EDUCATION/TRAINING PROGRAM

## 2020-02-19 PROCEDURE — 74177 CT ABD & PELVIS W/CONTRAST: CPT

## 2020-02-19 RX ORDER — LEVOTHYROXINE SODIUM 0.03 MG/1
25 TABLET ORAL
Status: DISCONTINUED | OUTPATIENT
Start: 2020-02-20 | End: 2020-02-20 | Stop reason: HOSPADM

## 2020-02-19 RX ORDER — 0.9 % SODIUM CHLORIDE 0.9 %
80 INTRAVENOUS SOLUTION INTRAVENOUS ONCE
Status: COMPLETED | OUTPATIENT
Start: 2020-02-19 | End: 2020-02-19

## 2020-02-19 RX ORDER — FLUCONAZOLE 100 MG/1
150 TABLET ORAL ONCE
Status: COMPLETED | OUTPATIENT
Start: 2020-02-19 | End: 2020-02-19

## 2020-02-19 RX ORDER — SODIUM CHLORIDE 0.9 % (FLUSH) 0.9 %
10 SYRINGE (ML) INJECTION PRN
Status: DISCONTINUED | OUTPATIENT
Start: 2020-02-19 | End: 2020-02-20 | Stop reason: HOSPADM

## 2020-02-19 RX ADMIN — MULTIPLE VITAMINS W/ MINERALS TAB 1 TABLET: TAB at 08:16

## 2020-02-19 RX ADMIN — IOHEXOL 50 ML: 240 INJECTION, SOLUTION INTRATHECAL; INTRAVASCULAR; INTRAVENOUS; ORAL at 11:28

## 2020-02-19 RX ADMIN — SODIUM CHLORIDE: 9 INJECTION, SOLUTION INTRAVENOUS at 15:04

## 2020-02-19 RX ADMIN — SODIUM CHLORIDE 80 ML: 9 INJECTION, SOLUTION INTRAVENOUS at 13:05

## 2020-02-19 RX ADMIN — OXYCODONE HYDROCHLORIDE AND ACETAMINOPHEN 500 MG: 500 TABLET ORAL at 08:16

## 2020-02-19 RX ADMIN — PIPERACILLIN SODIUM AND TAZOBACTAM SODIUM 3.38 G: 3; .375 INJECTION, POWDER, LYOPHILIZED, FOR SOLUTION INTRAVENOUS at 16:55

## 2020-02-19 RX ADMIN — FAMOTIDINE 20 MG: 10 INJECTION, SOLUTION INTRAVENOUS at 11:29

## 2020-02-19 RX ADMIN — ENOXAPARIN SODIUM 30 MG: 30 INJECTION SUBCUTANEOUS at 20:30

## 2020-02-19 RX ADMIN — MORPHINE SULFATE 2 MG: 2 INJECTION, SOLUTION INTRAMUSCULAR; INTRAVENOUS at 20:22

## 2020-02-19 RX ADMIN — CYCLOBENZAPRINE 5 MG: 10 TABLET, FILM COATED ORAL at 16:59

## 2020-02-19 RX ADMIN — PIPERACILLIN SODIUM AND TAZOBACTAM SODIUM 3.38 G: 3; .375 INJECTION, POWDER, LYOPHILIZED, FOR SOLUTION INTRAVENOUS at 08:24

## 2020-02-19 RX ADMIN — ACETAMINOPHEN 650 MG: 325 TABLET, FILM COATED ORAL at 16:59

## 2020-02-19 RX ADMIN — IOVERSOL 75 ML: 741 INJECTION INTRA-ARTERIAL; INTRAVENOUS at 13:05

## 2020-02-19 RX ADMIN — ENOXAPARIN SODIUM 30 MG: 30 INJECTION SUBCUTANEOUS at 08:16

## 2020-02-19 RX ADMIN — Medication 10 ML: at 13:05

## 2020-02-19 RX ADMIN — PIPERACILLIN SODIUM AND TAZOBACTAM SODIUM 3.38 G: 3; .375 INJECTION, POWDER, LYOPHILIZED, FOR SOLUTION INTRAVENOUS at 01:39

## 2020-02-19 RX ADMIN — MORPHINE SULFATE 2 MG: 2 INJECTION, SOLUTION INTRAMUSCULAR; INTRAVENOUS at 00:04

## 2020-02-19 RX ADMIN — FLUCONAZOLE 150 MG: 100 TABLET ORAL at 11:28

## 2020-02-19 RX ADMIN — FAMOTIDINE 20 MG: 10 INJECTION, SOLUTION INTRAVENOUS at 20:22

## 2020-02-19 RX ADMIN — IOHEXOL 50 ML: 240 INJECTION, SOLUTION INTRATHECAL; INTRAVASCULAR; INTRAVENOUS; ORAL at 13:05

## 2020-02-19 ASSESSMENT — PAIN SCALES - GENERAL
PAINLEVEL_OUTOF10: 2
PAINLEVEL_OUTOF10: 4
PAINLEVEL_OUTOF10: 4
PAINLEVEL_OUTOF10: 3
PAINLEVEL_OUTOF10: 1
PAINLEVEL_OUTOF10: 1

## 2020-02-19 ASSESSMENT — ENCOUNTER SYMPTOMS
DIARRHEA: 0
WHEEZING: 0
VOMITING: 0
SHORTNESS OF BREATH: 0
COUGH: 0
CONSTIPATION: 0
ABDOMINAL PAIN: 1
SORE THROAT: 0
ABDOMINAL DISTENTION: 1
BACK PAIN: 0
NAUSEA: 1

## 2020-02-19 ASSESSMENT — PAIN DESCRIPTION - LOCATION
LOCATION: HIP
LOCATION: ABDOMEN
LOCATION: HIP
LOCATION: ABDOMEN

## 2020-02-19 ASSESSMENT — PAIN DESCRIPTION - PAIN TYPE
TYPE: ACUTE PAIN
TYPE: CHRONIC PAIN
TYPE: ACUTE PAIN
TYPE: ACUTE PAIN

## 2020-02-19 ASSESSMENT — PAIN DESCRIPTION - ORIENTATION
ORIENTATION: RIGHT;LEFT
ORIENTATION: RIGHT;LEFT

## 2020-02-19 NOTE — PROGRESS NOTES
[facility]  DVT Prophylaxis and Vaccine Status  Work List  Mandatory for all patients      Patient must be on both Chemical prophylaxis and Mechanical prophylaxis.  If chemical/mechanical prophylaxis is not ordered, the physician must document a reason for not using prophylaxis     Chemical Prophylaxis  Is patient on chemical prophylaxis: Yes  If no chemical prophylaxis Is a order in for No Chemical VTE prophylaxisNo  If no was the physician notified not applicable      Mechanical Prophylaxis  Is patient on mechanical prophylaxis, intermittent pneumatic compression device: No  If no was the physician notified not applicable        Pneumonia Vaccine  Vaccine indicated:  Not indicated  If indicated was the vaccine given: not applicable    Influenza Vaccine (applicable from October through March):  Vaccine indicated: Vaccination refused  If indicated was the vaccine given: not applicable    Patient Education  Education completed on DVT prophylaxis: yes

## 2020-02-19 NOTE — PROGRESS NOTES
[facility] Internal Medicine    Progress Note    2/19/2020        Name:   Radha Pedraza  MRN:     [id number]     Acct:      [de-identified]   Room:   [facility] Day:  1  Admit Date:  2/18/2020  3:16 PM    PCP:   Alex Short  Code Status:  Full Code    Subjective:       Subjective:       2/19/2020     Chief Complaint   Patient presents with    Abdominal Pain     Intermittent x3 weeks    Wound Check     Near stoma, x1 month, went away and came back yesterday    Nausea    Bloated         Diverticulitis   Principal Problem:    Diverticulitis  Active Problems:    Skin ulcer ([address]    Morbid obesity ([facility] [address]    Tobacco use  Resolved Problems:    * No resolved hospital problems. *       Patient examined and chart reviewed . 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 She presented to the ER with intermittent lower abdomen pain for the past 3 weeks associated with nausea. Patient also noticed open wound below the stoma and she was not able to put on the colostomy bag. She initially noted the wound 2 months ago, treated with a course of antibiotics, only healed for a few weeks and opened up again. In the ER, she was febrile 101F, tachycardic. BP stable. Labs with mild leukocytosis 11.8. BMP normal.  CT abdomen showed complex sigmoid diverticulitis sequela with small amount of filled cavity suspicion for coloenteric fistula. She was started on IV Zosyn, IV hydration.   General surgery was consulted                 Significant last 24 hr data reviewed ;   Vitals:    02/18/20 2247 02/19/20 0119 02/19/20 0654 02/19/20 1334   BP: 95/64 100/61 101/62 (!) 130/90   Pulse: 101 83 85 90   Resp: 16 18 16 17   Temp: 98.6 °F (37 °C) 98.8 °F (37.1 °C) 98.9 °F (37.2 °C) 97.6 °F (36.4 °C)   TempSrc: Oral Oral Oral Oral   SpO2: 91% 92% 92% 98%   Weight:       Height:             Recent Results (from the past 24 hour(s))   CBC Auto Differential    Collection Time: 02/18/20  4:05 PM   Result Value Ref Range    WBC 11.8 (H) 3.5 - 11.0 k/uL    RBC 5.22 (H) 4.0 - 5.2 m/uL    Hemoglobin 13.3 12.0 - 16.0 g/dL    Hematocrit 41.8 36 - 46 %    MCV 80.0 80 - 100 fL    MCH 25.5 (L) 26 - 34 pg    MCHC 31.8 31 - 37 g/dL    RDW 17.2 (H) 11.5 - 14.9 %    Platelets 198 940 - 888 k/uL    MPV 8.8 6.0 - 12.0 fL    NRBC Automated NOT REPORTED per 100 WBC    Differential Type NOT REPORTED     Immature Granulocytes NOT REPORTED 0 %    Absolute Immature Granulocyte NOT REPORTED 0.00 - 0.30 k/uL    WBC Morphology NOT REPORTED     RBC Morphology NOT REPORTED     Platelet Estimate NOT REPORTED     Seg Neutrophils 87 (H) 36 - 66 %    Lymphocytes 8 (L) 24 - 44 %    Monocytes 3 1 - 7 %    Eosinophils % 1 0 - 4 %    Basophils 1 0 - 2 %    Segs Absolute 10.30 (H) 1.3 - 9.1 k/uL    Absolute Lymph # 1.00 1.0 - 4.8 k/uL    Absolute Mono # 0.30 0.1 - 1.3 k/uL    Absolute Eos # 0.20 0.0 - 0.4 k/uL    Basophils Absolute 0.10 0.0 - 0.2 k/uL   Comprehensive Metabolic Panel    Collection Time: 02/18/20  4:05 PM   Result Value Ref Range    Glucose 104 (H) 70 - 99 mg/dL    BUN 11 6 - 20 mg/dL    CREATININE 0.68 0.50 - 0.90 mg/dL    Bun/Cre Ratio NOT REPORTED 9 - 20    Calcium 9.5 8.6 - 10.4 mg/dL    Sodium 139 135 - 144 mmol/L    Potassium 4.1 3.7 - 5.3 mmol/L    Chloride 103 98 - 107 mmol/L    CO2 21 20 - 31 mmol/L    Anion Gap 15 9 - 17 mmol/L    Alkaline Phosphatase 152 (H) 35 - 104 U/L    ALT 15 5 - 33 U/L    AST 17 <32 U/L    Total Bilirubin 0.28 (L) 0.3 - 1.2 mg/dL    Total Protein 7.8 6.4 - 8.3 g/dL    Alb 4.1 3.5 - 5.2 g/dL    Albumin/Globulin Ratio NOT REPORTED 1.0 - 2.5    GFR Non-African American >60 >60 mL/min    GFR African American >60 >60 mL/min    GFR Comment          GFR Staging NOT REPORTED    Lipase    Collection Time: 02/18/20  4:05 PM   Result Value Ref Range    Lipase 26 13 - 60 U/L   Urinalysis Reflex to Culture    Collection Time: 02/18/20  4:05 PM   Result Value Ref Range    Color, UA YELLOW YELLOW    Turbidity UA CLEAR CLEAR    Glucose, Ur NEGATIVE NEGATIVE    Bilirubin Urine NEGATIVE NEGATIVE    Ketones, Urine NEGATIVE NEGATIVE    Specific Gravity, UA 1.002 1.000 - 1.030    Urine Hgb TRACE (A) NEGATIVE    pH, UA 6.0 5.0 - 8.0    Protein, UA NEGATIVE NEGATIVE    Urobilinogen, Urine Normal Normal    Nitrite, Urine NEGATIVE NEGATIVE    Leukocyte Esterase, Urine NEGATIVE NEGATIVE    Urinalysis Comments NOT REPORTED    Microscopic Urinalysis    Collection Time: 02/18/20  4:05 PM   Result Value Ref Range    -          WBC, UA 0 TO 2 /HPF    RBC, UA 2 TO 5 /HPF    Casts UA NOT REPORTED /LPF    Crystals UA NOT REPORTED None /HPF    Epithelial Cells UA 0 TO 2 /HPF    Renal Epithelial, Urine NOT REPORTED 0 /HPF    Bacteria, UA NOT REPORTED None    Mucus, UA NOT REPORTED None    Trichomonas, UA NOT REPORTED None    Amorphous, UA NOT REPORTED None    Other Observations UA NOT REPORTED NOT REQ.     Yeast, UA NOT REPORTED None   CBC Auto Differential    Collection Time: 02/19/20  4:19 AM   Result Value Ref Range    WBC 13.0 (H) 3.5 - 11.0 k/uL    RBC 4.42 4.0 - 5.2 m/uL    Hemoglobin 11.3 (L) 12.0 - 16.0 g/dL    Hematocrit 35.2 (L) 36 - 46 %    MCV 79.6 (L) 80 - 100 fL    MCH 25.5 (L) 26 - 34 pg    MCHC 32.0 31 - 37 g/dL    RDW 17.5 (H) 11.5 - 14.9 %    Platelets 674 257 - 938 k/uL    MPV 8.9 6.0 - 12.0 fL    NRBC Automated NOT REPORTED per 100 WBC    Differential Type NOT REPORTED     Immature Granulocytes NOT REPORTED 0 %    Absolute Immature Granulocyte NOT REPORTED 0.00 - 0.30 k/uL    WBC Morphology NOT REPORTED     RBC Morphology NOT REPORTED     Platelet Estimate NOT REPORTED     Seg Neutrophils 87 (H) 36 - 66 %    Lymphocytes 8 (L) 24 - 44 %    Monocytes 5 1 - 7 %    Eosinophils % 0 0 - 4 %    Basophils 0 0 - 2 %    Segs Absolute 11.20 (H) 1.3 - 9.1 k/uL    Absolute Lymph # 1.10 1.0 - 4.8 k/uL    Absolute Mono # 0.70 0.1 - 1.3 k/uL    Absolute Eos # 0.00 0.0 - 0.4 k/uL    Basophils Absolute 0.10 0.0 - 0.2 k/uL   Basic Metabolic Prof    Collection Time: 02/19/20  4:19 AM   Result Value Ref Range    Glucose 139 (H) 70 - 99 mg/dL    BUN 10 6 - 20 mg/dL    CREATININE 0.82 0.50 - 0.90 mg/dL    Bun/Cre Ratio NOT REPORTED 9 - 20    Calcium 8.3 (L) 8.6 - 10.4 mg/dL    Sodium 137 135 - 144 mmol/L    Potassium 4.3 3.7 - 5.3 mmol/L    Chloride 102 98 - 107 mmol/L    CO2 24 20 - 31 mmol/L    Anion Gap 11 9 - 17 mmol/L    GFR Non-African American >60 >60 mL/min    GFR African American >60 >60 mL/min    GFR Comment          GFR Staging NOT REPORTED    Protime-INR    Collection Time: 02/19/20  4:19 AM   Result Value Ref Range    Protime 14.3 11.8 - 14.6 sec    INR 1.1    TSH with Reflex    Collection Time: 02/19/20  4:19 AM   Result Value Ref Range    TSH 16.34 (H) 0.30 - 5.00 mIU/L   T4, Free    Collection Time: 02/19/20  4:19 AM   Result Value Ref Range    Thyroxine, Free 0.74 (L) 0.93 - 1.70 ng/dL          No results for input(s): POCGLU in the last 72 hours. Ct Abdomen Pelvis W Iv Contrast Additional Contrast? Oral And Rectal    Result Date: 2/19/2020  EXAMINATION: CT OF THE ABDOMEN AND PELVIS WITH CONTRAST 2/19/2020 12:36 pm TECHNIQUE: CT of the abdomen and pelvis was performed with the administration of intravenous contrast. Multiplanar reformatted images are provided for review. Dose modulation, iterative reconstruction, and/or weight based adjustment of the mA/kV was utilized to reduce the radiation dose to as low as reasonably achievable. COMPARISON: None HISTORY: ORDERING SYSTEM PROVIDED HISTORY: Possible colo-enteric fistula TECHNOLOGIST PROVIDED HISTORY: Possible colo-enteric fistula Reason for Exam: PT HAS COLOSTOMY, ?  FISTULA/ RESECTION, pt tried her best to hold the rectal contrast in, she couldn't tolerate very much rectal contrast, complained of alot of pain. Acuity: Unknown Type of Exam: Unknown Relevant Medical/Surgical History: Colocutaneous fistula ABSCESS, COLOSTOMY, DIVERTICULITIS, HERNIA REPAIR, GB, APPY FINDINGS: Lower Chest: Bibasilar atelectasis. Organs: No focal hepatic lesion. Steatosis of the liver. Mild splenomegaly suspected without discrete lesion. No adrenal lesion. Punctate nephroliths bilateral kidneys. Normal pancreas. GI/Bowel: Right lower quadrant ostomy. Diverticula of the distal colon extending into the ostomy site. Rectum opacified with contrast.  Sigmoid diverticulosis. Contrast instilled from retrograde position reaches the descending colon. No dilated small bowel. Suspected fistula in the deep pelvis (series 2, image 148). The size of the tract has decreased but the colon and small bowel appear to be contiguous. Adjacent scarring and inflammatory changes in the pelvis. Pelvis: Fibroid uterus. Bladder wall thickening probably reactive in nature. Peritoneum/Retroperitoneum: Prominent mesenteric nodes may be reactive to presence of fistula. No drainable fluid collection. Stable vessels. Bones/Soft Tissues: No acute osseous abnormality. Soft tissue swelling of the back. Ostomy right lower quadrant. Defect in the anterior abdominal wall adjacent to the hernia as well as on the left. These are compatible with ventral hernias. Decrease in size of the probable fistulous communication between large and small bowel in the deep pelvis. Mesenteric lymph nodes are prominent but likely reactive. Diffuse diverticulosis. Ct Abdomen Pelvis W Iv Contrast    Result Date: 2/18/2020  EXAMINATION: CT OF THE ABDOMEN AND PELVIS WITH CONTRAST 2/18/2020 5:07 pm TECHNIQUE: CT of the abdomen and pelvis was performed with the administration of intravenous contrast. Multiplanar reformatted images are provided for review.  Dose modulation, iterative reconstruction, and/or weight based adjustment of the mA/kV was utilized to reduce the radiation dose to as low as reasonably achievable. COMPARISON: 04/11/2018, 03/07/2018 and 01/22/2018. HISTORY: ORDERING SYSTEM PROVIDED HISTORY: abd pain, nausea, bloating. Chronic stoma. Hx abd abscess TECHNOLOGIST PROVIDED HISTORY: IV Only Contrast abd pain, nausea, bloating. Chronic stoma. Hx abd abscess Reason for Exam: abd pain, nausea, bloating. Chronic stoma. Hx abd abscess FINDINGS: Lower Chest: Subsegmental atelectasis or scarring in the left lung base. Organs: Minimal pneumobilia and cholecystectomy are new findings in the interval.  The liver, pancreas, spleen, adrenals and kidneys reveal no acute findings. GI/Bowel: There is no bowel dilatation or wall thickening identified. Loop colostomy in the right abdomen. Status post small bowel resection in the left abdomen. Small bowel feces is noted in this region, however no significant upstream small bowel dilatation. Pelvis: Calcified right fundal fibroid. Previously seen inflammatory change and fluid collection involving the sigmoid colon in this area has improved, however a small fluid and air containing cavity remains visible on axial image 154 measuring 7.2 x 1.3 cm. This abuts the posterior aspect of the uterus and extends 2 a loop of small bowel anteriorly on axial image 161 and sagittal image 77. No new fluid collection identified. Peritoneum/Retroperitoneum: No free air. No ascites. No lymphadenopathy. The aorta is normal in caliber. Bones/Soft Tissues: Diastasis of the abdominal wall. Multilevel degenerative change in the spine. 1.  Sequela of complicated sigmoid diverticulitis with persistent cavity with a small amount of fluid and gas in the pelvis. This is suggestive of a colo-enteric fistula. This could be further evaluated with barium enema if clinically appropriate. No new fluid collection or active diverticulitis.  2.  Findings of stasis are noted at the small bowel General: No focal deficit present. Mental Status: She is alert and oriented to person, place, and time. Psychiatric:         Mood and Affect: Mood is anxious. Data:     I/O (24Hr): Intake/Output Summary (Last 24 hours) at 2/19/2020 1353  Last data filed at 2/18/2020 1847  Gross per 24 hour   Intake 1000 ml   Output --   Net 1000 ml     Vitals:    02/18/20 2247 02/19/20 0119 02/19/20 0654 02/19/20 1334   BP: 95/64 100/61 101/62 (!) 130/90   Pulse: 101 83 85 90   Resp: 16 18 16 17   Temp: 98.6 °F (37 °C) 98.8 °F (37.1 °C) 98.9 °F (37.2 °C) 97.6 °F (36.4 °C)   TempSrc: Oral Oral Oral Oral   SpO2: 91% 92% 92% 98%   Weight:       Height:            CONSULTANT INPUT NOTED -  General surgery recommending CT with oral and rectal contrast, advancing diet and continuing antibiotic treatment. Recommended colonoscopy        Assessment:        Diverticulitis   Active Hospital Problems    Diagnosis Date Noted    Skin ulcer ([facility] [address] [L98.499] 02/19/2020    Morbid obesity ([address] [E66.01] 02/19/2020    Tobacco use [Z72.0] 02/19/2020    Diverticulitis [K57.92] 02/18/2020         CLINICAL COURSE ---          clinical course has been stable,       Plan:        Principal Problem:    Diverticulitis  Active Problems:    History of diverticulitis of colon    Skin ulcer ([address]    Morbid obesity ([address]    Tobacco use    S/P colostomy ([facility] [address]  Resolved Problems:    * No resolved hospital problems. *    1. Possible acute diverticulitis. History of colonic diverticulitis with perforation s/p colostomy. Continue IV Zosyn. IV hydration. F/U blood and urine cultures  Liquid diet. Advance diet to full liquids. General surgery on board. Recommended CT with oral and rectal contrast to rule out coloenteric fistula. Eventually will need colostomy revision and hernia repair  Recommended colonscopy    2. Skin wound near ostomy site. Wound care and ostomy care. 3. Lovenox 40 mg SC daily for DVT prophylaxis  4.  Pepcid 20 mg IV twice daily for GI prophylaxis    Medications: Allergies: Allergies   Allergen Reactions    Codeine Other (See Comments)     Causes worsening pain       Current Meds:   Scheduled Meds:    famotidine (PEPCID) injection  20 mg Intravenous BID    [START ON 2/20/2020] levothyroxine  25 mcg Oral QAM AC    sodium chloride flush  10 mL Intravenous 2 times per day    therapeutic multivitamin-minerals  1 tablet Oral Daily    vitamin C  500 mg Oral Daily    enoxaparin  30 mg Subcutaneous BID    piperacillin-tazobactam  3.375 g Intravenous Q8H     Continuous Infusions:    sodium chloride 125 mL/hr at 02/18/20 2122     PRN Meds: nicotine polacrilex, sodium chloride flush, sodium chloride flush, morphine **OR** morphine, ondansetron, acetaminophen, cyclobenzaprine, magnesium sulfate, magnesium hydroxide  Gilda Youssef MD    Please note that this chart was generated using voice recognition Dragon dictation software. Although every effort was made to ensure the accuracy of this automated transcription, some errors in transcription may have occurred. Gilda Youssef MD  2/19/2020    [facility] [address], [address] [place] [address].    Phone [phone]   Fax: [phone]  Answering Service: [phone]

## 2020-02-19 NOTE — PROGRESS NOTES
to rotate through ostomy supplies (flange sizes and/or different convexities so that the pressure is not constantly rubbing the same area.     Specialty Bed Required : N/A   [] Low Air Loss   [] Pressure Redistribution  [] Fluid Immersion  [] Bariatric  [] Total Pressure Relief  [] Other:     Current Diet: DIET CLEAR LIQUID;  DIET FULL LIQUID;  Dietician consult:  N/A    Discharge Plan:  Placement for patient upon discharge: home with support   Patient appropriate for Outpatient [address]: Yes    Patient/Caregiver Teaching:  Level of patientunderstanding able to:     [x] Indicates understanding       [] Needs reinforcement  [] Unsuccessful      [x] Verbal Understanding  [] Demonstrated understanding       [] No evidence of learning  [] Refused teaching         [] N/A       Electronically signed by Arthur Smart RN on  2/19/2020 at 11:47 AM

## 2020-02-19 NOTE — CARE COORDINATION
ONGOING DISCHARGE PLAN:    Spoke with patient regarding discharge plan and patient confirms that plan is still to return to home w/ Daughter. Pt. Denies VNS. Pt. Has Ostomy, she does not have Insurance, so she gets her supplies from [place], for a small shipping fee. Wound Care Nurse following. HELP following. Pt. Remains NPO, WBC 13.0, On Zosyn. Surgery following. Follow for possible med assist.     Will continue to follow for additional discharge needs.     Electronically signed by Julito Raya RN on 2/19/2020 at 10:37 AM

## 2020-02-19 NOTE — PROGRESS NOTES
Pt up to floor with fever of 101.2 and tachycardia. Mews of 4. CNP [facility] notified and new order received for tylenol. Also notified Waldo Gilsum of sepsis notice due to vitals. Will recheck pt shortly.

## 2020-02-19 NOTE — H&P
[address] [facility]     HISTORY AND PHYSICAL EXAMINATION            Date:   2/19/2020  Patientname:  Herson Reddy  Date of admission:  2/18/2020  3:16 PM  MRN:   [id number]  Account:  [de-identified]  YOB: 1960  PCP:    Higinio Abdul  Room:   2125/2125-01  Code Status:    Full Code    CHIEF COMPLAINT     Chief Complaint   Patient presents with    Abdominal Pain     Intermittent x3 weeks    Wound Check     Near stoma, x1 month, went away and came back yesterday    Nausea    Bloated       HISTORY OF PRESENT ILLNESS  (Character, Onset, Location, Duration,  Exacerbating/RelievingFactors, Radiation,   Associated Symptoms, Severity )      The patient is a 61 y.o.  female, with a history of GERD, diverticulitis of colon with perforation, colostomy, and tobacco use,  who presents with abdominal pain, nausea, bloating, and for wound check. According to patient, she has had mild, bilateral, lower abdominal pain intermittently for the past 3 weeks. Reports that today, the pain intensified and was accompanied by nausea and abdominal bloating. Patient also reports having an open wound approximately 1 inch below colostomy stoma, prohibiting her from wearing a colostomy bag. Patient reports that wound opened approximately 6 weeks ago and was treated with a round of antibiotics by her PCP. Reports that it took nearly 1 month for the ulceration to heal.  States that wound has been healed for approximately 1 to 2 weeks but opened again a couple of days ago and has rapidly grown in size. Denies fever chills chest pain, cough,  vomiting, and diarrhea. There are no aggravating or alleviating factors. Symptoms are reported as constant and moderate. Reports that lower abdominal pain has improved and nausea and bloating have resolved since arrival to the ED.     PAST MEDICAL HISTORY   Patient  has a past medical history of Blood circulation, collateral and GERD (gastroesophageal reflux disease). PAST SURGICAL HISTORY    Patient  has a past surgical history that includes Appendectomy; Abdomen surgery; hernia repair; Small intestine surgery; and Cholecystectomy. FAMILY HISTORY    Patient family history includes Cancer in her maternal aunt, maternal grandmother, and mother; Coronary Art Dis in her father and mother; Diabetes in her brother, brother, and mother; Heart Disease in her brother, father, and mother; High Blood Pressure in her brother and brother; High Cholesterol in her brother and brother; Parkinsonism in her mother. SOCIAL HISTORY    Patient  reports that she has been smoking. She has never used smokeless tobacco. She reports that she does not drink alcohol or use drugs. HOME MEDICATIONS        Prior to Admission medications    Medication Sig Start Date End Date Taking? Authorizing Provider   cyclobenzaprine (FLEXERIL) 5 MG tablet Take 5 mg by mouth 3 times daily as needed for Muscle spasms    Yes Historical Provider, MD   acetaminophen (TYLENOL) 325 MG tablet Take 650 mg by mouth every 6 hours as needed for Pain   Yes Historical Provider, MD   Multiple Vitamins-Minerals (THERAPEUTIC MULTIVITAMIN-MINERALS) tablet Take 1 tablet by mouth daily   Yes Historical Provider, MD   vitamin C (ASCORBIC ACID) 500 MG tablet Take 500 mg by mouth daily   Yes Historical Provider, MD   zinc gluconate 50 MG tablet Take 50 mg by mouth daily   Yes Historical Provider, MD   Black Elderberry,Berry-Flower, 575 MG CAPS Take 1 capsule by mouth daily   Yes Historical Provider, MD       ALLERGIES      Codeine    REVIEW OF SYSTEMS     Review of Systems   Constitutional: Negative for chills, diaphoresis and fever. HENT: Negative for congestion and sore throat. Respiratory: Negative for cough, shortness of breath and wheezing. Cardiovascular: Negative for chest pain, palpitations and leg swelling.    Gastrointestinal: Positive for abdominal distention (\"bloating\"), abdominal pain and REPORTED       Imaging/Diagonstics:     Ct Abdomen Pelvis W Iv Contrast    Result Date: 2/18/2020  EXAMINATION: CT OF THE ABDOMEN AND PELVIS WITH CONTRAST 2/18/2020 5:07 pm TECHNIQUE: CT of the abdomen and pelvis was performed with the administration of intravenous contrast. Multiplanar reformatted images are provided for review. Dose modulation, iterative reconstruction, and/or weight based adjustment of the mA/kV was utilized to reduce the radiation dose to as low as reasonably achievable. COMPARISON: 04/11/2018, 03/07/2018 and 01/22/2018. HISTORY: ORDERING SYSTEM PROVIDED HISTORY: abd pain, nausea, bloating. Chronic stoma. Hx abd abscess TECHNOLOGIST PROVIDED HISTORY: IV Only Contrast abd pain, nausea, bloating. Chronic stoma. Hx abd abscess Reason for Exam: abd pain, nausea, bloating. Chronic stoma. Hx abd abscess FINDINGS: Lower Chest: Subsegmental atelectasis or scarring in the left lung base. Organs: Minimal pneumobilia and cholecystectomy are new findings in the interval.  The liver, pancreas, spleen, adrenals and kidneys reveal no acute findings. GI/Bowel: There is no bowel dilatation or wall thickening identified. Loop colostomy in the right abdomen. Status post small bowel resection in the left abdomen. Small bowel feces is noted in this region, however no significant upstream small bowel dilatation. Pelvis: Calcified right fundal fibroid. Previously seen inflammatory change and fluid collection involving the sigmoid colon in this area has improved, however a small fluid and air containing cavity remains visible on axial image 154 measuring 7.2 x 1.3 cm. This abuts the posterior aspect of the uterus and extends 2 a loop of small bowel anteriorly on axial image 161 and sagittal image 77. No new fluid collection identified. Peritoneum/Retroperitoneum: No free air. No ascites. No lymphadenopathy. The aorta is normal in caliber. Bones/Soft Tissues: Diastasis of the abdominal wall.   Multilevel degenerative change in the spine. 1.  Sequela of complicated sigmoid diverticulitis with persistent cavity with a small amount of fluid and gas in the pelvis. This is suggestive of a colo-enteric fistula. This could be further evaluated with barium enema if clinically appropriate. No new fluid collection or active diverticulitis. 2.  Findings of stasis are noted at the small bowel anastomosis in the left abdomen. No evidence for bowel obstruction. ASSESSMENT  and  PLAN     Principal Problem:    Diverticulitis  Active Problems:    Skin ulcer ([address]    Morbid obesity ([address]    Tobacco use  Resolved Problems:    * No resolved hospital problems. *    Plan:  Acute Diverticulitis  -Zosyn initiated in ED  --Continue on admission  -NPO except ice chips and sips with meds  -IV NS@ 125ml/hr  -CT scan concerning for fistula  -Surgery consult    Skin Ulcer  -Open area approximately one inch below stoma  -Area beefy red  -unable to wear stoma appliance d/t wound  -Wound Ostomy nurse eval and treat    Hypothyroidism  -Patient reports that she stopped taking her synthroid several months ago  -Does not want to resume at this time  -Check TSH with Reflex T4 in am    Tobacco use   -reports smoking only couple cigarettes daily  -smoking cessation education  -nicotine gum ordered PRN    Consultations:     IP CONSULT TO [address] [place] CONSULT TO INTERNAL MEDICINE  IP [facility]  [facility]   2/19/2020  5:19 AM    [address] [facility] [address], [address] [place] [address]. Phone  and add on       I have discussed the care of Regis Hamilton ,   including pertinent history and exam findings,      2/19/20   with the Nu Dominique CNP  I have seen and examined the patient and the key elements of all parts of the encounter have been performed by me . I agree with the assessment, plan and orders as documented by the resident.      Principal Problem:    Diverticulitis  Active Problems:    History of diverticulitis of colon    Skin ulcer ([facility]    Morbid obesity ([facility]    Tobacco use    S[facility]P [facility]ostomy [facility]  Resolved Problems:    * No resolved hospital problems. *        -    Condition    [x] ill ,     [x] high risk , [] critical ,          [] improved but still labile                                        [] delirium ,      [] -----,                 [] I----     Unit  [] ICU           [] PICU       [] MED_SRG             []  Other    Prognosis     ---                   Medications: Allergies: Allergies   Allergen Reactions    Codeine Other (See Comments)     Causes worsening pain       Current Meds:   Scheduled Meds:    famotidine (PEPCID) injection  20 mg Intravenous BID    [START ON 2/20/2020] levothyroxine  25 mcg Oral QAM AC    sodium chloride flush  10 mL Intravenous 2 times per day    therapeutic multivitamin-minerals  1 tablet Oral Daily    vitamin C  500 mg Oral Daily    enoxaparin  30 mg Subcutaneous BID    piperacillin-tazobactam  3.375 g Intravenous Q8H     Continuous Infusions:    sodium chloride 125 mL/hr at 02/19/20 1504     PRN Meds: nicotine polacrilex, sodium chloride flush, sodium chloride flush, morphine **OR** morphine, ondansetron, acetaminophen, cyclobenzaprine, magnesium sulfate, magnesium hydroxide      ---- ;     [address], [address] [place] [address].    Phone [phone]   Fax: [phone]  Answering Service: [phone]

## 2020-02-19 NOTE — ED NOTES
Report given to Mark Busby RN from U. Report method by phone   The following was reviewed with receiving RN:   Current vital signs:  BP (!) 140/93   Pulse 136   Temp 97.8 °F (36.6 °C)   Resp 16   Ht 5' 7.5\" (1.715 m)   Wt 240 lb (108.9 kg)   SpO2 93%   BMI 37.03 kg/m²                      Any medication or safety alerts were reviewed. Any pending diagnostics and notifications were also reviewed, as well as any safety concerns or issues, abnormal labs, abnormal imaging, and abnormal assessment findings. Questions were answered.           Eda Osler, RN  02/18/20 7708

## 2020-02-19 NOTE — CONSULTS
[address]    Patient Name: Valerie Guerrero     Patient : 1960  Room/Bed: 2125/2125-01  Admission Date/Time: 2020  3:16 PM  Primary Care Physician: Uday Su    Consulting Provider: Quinn Mcmillan DO  Reason for Consult: Diverticulitis s/p partial resection and ostomy    CC:   Chief Complaint   Patient presents with    Abdominal Pain     Intermittent x3 weeks    Wound Check     Near stoma, x1 month, went away and came back yesterday    Nausea    Bloated                HPI: Valerie Guerrero is a 61 y.o. female presents with abdominal pain, bloating and nausea. She also admits to a small wound near her ostomy site which has persisted. The patient first noticed this wound about a month ago, was given a course of PO antibiotics, with resolution of the wound. However, it returned and she became concerned when she started to also experience the abdominal pain and nausea. She denies any changes to her stools or blood in her stools. She denies any vomiting. She denies any changes in weight. The patient has previously had a partial colon resection for diverticulitis and currently has an ostomy. The wound near her ostomy site is uncomfortable when she has a bag in place so she has not been using an ostomy bag. The patient ws noted to be febrile yesterday and tachycardic. She was started on zosyn. CT abdomen/pelvis completed yesterday showed no active diverticulitis however there was suspicion for a colo-enteric fistula with a fluid/gas collection in the pelvis. She has previously had a colonoscopy but believes it was about 20 years ago and has not had one since.     REVIEW OF SYSTEMS:   Constitutional: negative fever, negative chills  HEENT: negative visual disturbances, negative headaches  Respiratory: negative dyspnea, negative cough  Cardiovascular: negative chest pain,  negative palpitations  Gastrointestinal: positive abdominal pain and bloating, positive nausea, no vomiting, negative diarrhea, negative constipation  Genitourinary: negative dysuria  Dermatological: negative rash, positive ulcerated wound on abdomen  Hematologic: negative bruising  Immunologic/Lymphatic: negative recent illness, negative recent sick contact  Musculoskeletal: negative back pain  Neurological:  negative dizziness, negative weakness  Behavior/Psych: negative depression, negative anxiety   ____________________________________________________________  PAST MEDICAL HISTORY:   has a past medical history of Blood circulation, collateral and GERD (gastroesophageal reflux disease). PAST SURGICAL HISTORY:   has a past surgical history that includes Appendectomy; Abdomen surgery; hernia repair; Small intestine surgery; and Cholecystectomy.     ALLERGIES:  Allergies as of 02/18/2020 - Review Complete 02/18/2020   Allergen Reaction Noted    Codeine Other (See Comments) 01/03/2018       MEDICATIONS:  Current Facility-Administered Medications   Medication Dose Route Frequency Provider Last Rate Last Dose    nicotine polacrilex (NICORETTE) gum 2 mg  2 mg Oral Q1H PRN Ether Blue, APRN - CNP        sodium chloride flush 0.9 % injection 10 mL  10 mL Intravenous 2 times per day Henrietta M Modesti, DO        sodium chloride flush 0.9 % injection 10 mL  10 mL Intravenous PRN Henrietta M Modesti, DO        morphine (PF) injection 2 mg  2 mg Intravenous Q2H PRN Henrietta M Modesti, DO   2 mg at 02/19/20 0004    Or    morphine sulfate (PF) injection 4 mg  4 mg Intravenous Q2H PRN Henrietta M Modesti, DO        ondansetron (ZOFRAN) injection 4 mg  4 mg Intravenous Q8H PRN Henrietta M Modesti, DO        0.9 % sodium chloride infusion   Intravenous Continuous Henrietta M Modesti,  mL/hr at 02/18/20 2122      acetaminophen (TYLENOL) tablet 650 mg  650 mg Oral Q4H PRN Ether Blue, APRN - CNP   650 mg at 02/18/20 2058    cyclobenzaprine (FLEXERIL) tablet 5 mg  5 mg Oral TID PRN Ether Blue, diverticulitis   - VSS, afebrile since yesterday, tachycardia has resolved   - CT abdomen/pelvis with oral and rectal contrast to evaluate possible colo-enteric fistula   - Leukocytosis present: 11.8 on admission to 13.0 today   - Abx: Continue Zosyn   - Clear liquid diet, advance to full liquid this afternoon   - Encourage ambulation and use of incentive spirometer   - DVT Prophylaxis: Lovenox 30mg BID   - GI Prophylaxis: Pepcid BID   - Pain control: Morphine pushes PRN   - Consult for stoma care, evaluation of stoma site and bag placement for current wound, change bag q3 days    - Aquacel in wound near stoma, change weekly, patient will need wound care outpatient    - Possible colonoscopy during this admission, patient says last scope was about 20 years ago   - Known extensive diverticular disease   - Patient will eventually need ostomy reversal and midline hernia repair      Patient Active Problem List    Diagnosis Date Noted    Skin ulcer ([facility] 02/19/2020    Morbid obesity ([address] 02/19/2020    Tobacco use 02/19/2020    Diverticulitis 02/18/2020    Abscess     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 Jordan Culver, who is agreeable. Attending's Name: Dr. Jocelin Salazar DO  Ob/Gyn Resident  Pager: [phone]  2/19/2020, 8:21 AM    Attending Physician Statement  I have discussed the care of [place] Resendiz, including pertinent history and exam findings with the resident/fellow. I have reviewed the key elements of all parts of the encounter with the resident/fellow. I have seen and examined the patient with the resident/fellow. I agree with the assessment and plan and status of the problem list as documented.   Additionally I recommen repeat ct scan with oral and rectal contrast.  Electronically signed by Dorothea Pedersen MD on 2/19/2020 at 12:54 PM

## 2020-02-20 VITALS
WEIGHT: 260.36 LBS | DIASTOLIC BLOOD PRESSURE: 60 MMHG | RESPIRATION RATE: 19 BRPM | OXYGEN SATURATION: 99 % | BODY MASS INDEX: 39.46 KG/M2 | SYSTOLIC BLOOD PRESSURE: 138 MMHG | HEART RATE: 87 BPM | HEIGHT: 68 IN | TEMPERATURE: 98.1 F

## 2020-02-20 LAB
ABSOLUTE EOS #: 0.1 K/UL (ref 0–0.4)
ABSOLUTE IMMATURE GRANULOCYTE: ABNORMAL K/UL (ref 0–0.3)
ABSOLUTE LYMPH #: 1 K/UL (ref 1–4.8)
ABSOLUTE MONO #: 0.5 K/UL (ref 0.1–1.3)
ANION GAP SERPL CALCULATED.3IONS-SCNC: 8 MMOL/L (ref 9–17)
BASOPHILS # BLD: 1 % (ref 0–2)
BASOPHILS ABSOLUTE: 0 K/UL (ref 0–0.2)
BUN BLDV-MCNC: 8 MG/DL (ref 6–20)
BUN/CREAT BLD: ABNORMAL (ref 9–20)
CALCIUM SERPL-MCNC: 8.1 MG/DL (ref 8.6–10.4)
CHLORIDE BLD-SCNC: 112 MMOL/L (ref 98–107)
CO2: 24 MMOL/L (ref 20–31)
CREAT SERPL-MCNC: 0.81 MG/DL (ref 0.5–0.9)
DIFFERENTIAL TYPE: ABNORMAL
EOSINOPHILS RELATIVE PERCENT: 2 % (ref 0–4)
GFR AFRICAN AMERICAN: >60 ML/MIN
GFR NON-AFRICAN AMERICAN: >60 ML/MIN
GFR SERPL CREATININE-BSD FRML MDRD: ABNORMAL ML/MIN/{1.73_M2}
GFR SERPL CREATININE-BSD FRML MDRD: ABNORMAL ML/MIN/{1.73_M2}
GLUCOSE BLD-MCNC: 123 MG/DL (ref 70–99)
HCT VFR BLD CALC: 32.7 % (ref 36–46)
HEMOGLOBIN: 10.6 G/DL (ref 12–16)
IMMATURE GRANULOCYTES: ABNORMAL %
LYMPHOCYTES # BLD: 20 % (ref 24–44)
MCH RBC QN AUTO: 25.9 PG (ref 26–34)
MCHC RBC AUTO-ENTMCNC: 32.4 G/DL (ref 31–37)
MCV RBC AUTO: 79.9 FL (ref 80–100)
MONOCYTES # BLD: 10 % (ref 1–7)
NRBC AUTOMATED: ABNORMAL PER 100 WBC
PDW BLD-RTO: 17.4 % (ref 11.5–14.9)
PLATELET # BLD: 212 K/UL (ref 150–450)
PLATELET ESTIMATE: ABNORMAL
PMV BLD AUTO: 8.7 FL (ref 6–12)
POTASSIUM SERPL-SCNC: 3.8 MMOL/L (ref 3.7–5.3)
RBC # BLD: 4.1 M/UL (ref 4–5.2)
RBC # BLD: ABNORMAL 10*6/UL
SEG NEUTROPHILS: 67 % (ref 36–66)
SEGMENTED NEUTROPHILS ABSOLUTE COUNT: 3.4 K/UL (ref 1.3–9.1)
SODIUM BLD-SCNC: 144 MMOL/L (ref 135–144)
WBC # BLD: 5 K/UL (ref 3.5–11)
WBC # BLD: ABNORMAL 10*3/UL

## 2020-02-20 PROCEDURE — 2500000003 HC RX 250 WO HCPCS: Performed by: STUDENT IN AN ORGANIZED HEALTH CARE EDUCATION/TRAINING PROGRAM

## 2020-02-20 PROCEDURE — 36415 COLL VENOUS BLD VENIPUNCTURE: CPT

## 2020-02-20 PROCEDURE — 80048 BASIC METABOLIC PNL TOTAL CA: CPT

## 2020-02-20 PROCEDURE — 2580000003 HC RX 258: Performed by: EMERGENCY MEDICINE

## 2020-02-20 PROCEDURE — 99211 OFF/OP EST MAY X REQ PHY/QHP: CPT

## 2020-02-20 PROCEDURE — 6360000002 HC RX W HCPCS: Performed by: NURSE PRACTITIONER

## 2020-02-20 PROCEDURE — 99233 SBSQ HOSP IP/OBS HIGH 50: CPT | Performed by: INTERNAL MEDICINE

## 2020-02-20 PROCEDURE — 6360000002 HC RX W HCPCS: Performed by: EMERGENCY MEDICINE

## 2020-02-20 PROCEDURE — 6370000000 HC RX 637 (ALT 250 FOR IP): Performed by: STUDENT IN AN ORGANIZED HEALTH CARE EDUCATION/TRAINING PROGRAM

## 2020-02-20 PROCEDURE — 85025 COMPLETE CBC W/AUTO DIFF WBC: CPT

## 2020-02-20 PROCEDURE — 6370000000 HC RX 637 (ALT 250 FOR IP): Performed by: NURSE PRACTITIONER

## 2020-02-20 RX ORDER — CIPROFLOXACIN 500 MG/1
500 TABLET, FILM COATED ORAL 2 TIMES DAILY
Qty: 20 TABLET | Refills: 0 | Status: SHIPPED | OUTPATIENT
Start: 2020-02-20 | End: 2020-03-01

## 2020-02-20 RX ORDER — FAMOTIDINE 20 MG/1
20 TABLET, FILM COATED ORAL 2 TIMES DAILY
Status: DISCONTINUED | OUTPATIENT
Start: 2020-02-20 | End: 2020-02-20 | Stop reason: HOSPADM

## 2020-02-20 RX ORDER — METRONIDAZOLE 500 MG/1
500 TABLET ORAL 3 TIMES DAILY
Qty: 30 TABLET | Refills: 0 | Status: SHIPPED | OUTPATIENT
Start: 2020-02-20 | End: 2020-03-01

## 2020-02-20 RX ADMIN — PIPERACILLIN SODIUM AND TAZOBACTAM SODIUM 3.38 G: 3; .375 INJECTION, POWDER, LYOPHILIZED, FOR SOLUTION INTRAVENOUS at 00:57

## 2020-02-20 RX ADMIN — SODIUM CHLORIDE: 9 INJECTION, SOLUTION INTRAVENOUS at 00:57

## 2020-02-20 RX ADMIN — PIPERACILLIN SODIUM AND TAZOBACTAM SODIUM 3.38 G: 3; .375 INJECTION, POWDER, LYOPHILIZED, FOR SOLUTION INTRAVENOUS at 09:23

## 2020-02-20 RX ADMIN — FAMOTIDINE 20 MG: 10 INJECTION, SOLUTION INTRAVENOUS at 09:22

## 2020-02-20 RX ADMIN — OXYCODONE HYDROCHLORIDE AND ACETAMINOPHEN 500 MG: 500 TABLET ORAL at 09:23

## 2020-02-20 RX ADMIN — LEVOTHYROXINE SODIUM 25 MCG: 25 TABLET ORAL at 06:29

## 2020-02-20 RX ADMIN — ENOXAPARIN SODIUM 30 MG: 30 INJECTION SUBCUTANEOUS at 09:22

## 2020-02-20 RX ADMIN — MULTIPLE VITAMINS W/ MINERALS TAB 1 TABLET: TAB at 09:22

## 2020-02-20 ASSESSMENT — ENCOUNTER SYMPTOMS
VOMITING: 0
COUGH: 0
NAUSEA: 0
ABDOMINAL PAIN: 0
DIARRHEA: 0

## 2020-02-20 NOTE — FLOWSHEET NOTE
02/20/20 1514   Encounter Summary   Services provided to: Patient   Referral/Consult From: Rounding   Continue Visiting   (2-20-20)   Complexity of Encounter Moderate   Length of Encounter 15 minutes   Spiritual Assessment Completed Yes   Spiritual/[place]   Type Spiritual support   Assessment Calm; Approachable; Hopeful   Intervention Active listening;Prayer;Sustaining presence/ Ministry of presence   Outcome Comfort;Expressed gratitude;Encouraged; Hopeful;Receptive

## 2020-02-20 NOTE — DISCHARGE SUMMARY
in stable condition to follow up with me in 1-2 weeks        Electronically signed by Dorothea Pedersen MD on 2/25/2020 at 11:54 AM

## 2020-02-20 NOTE — PROGRESS NOTES
[facility]    Progress Note    2/20/2020        Name:   Joe Nicole  MRN:     [id number]     Acct:      [de-identified]   Room:   2125/2125[facility]  IP Day:  2  Admit Date:  2/18/2020  3:16 PM    PCP:   Lenny Schumacher  Code Status:  Full Code    Subjective:   Patient seen and examined bedside  No complaints of abdominal pain nausea or vomiting  Complaints of some lower abdominal discomfort  No urinary symptoms  Vitals have been stable. No fever or tachycardia  Patient is having bowel movements through her ostomy    Subjective:       2/20/2020     Chief Complaint   Patient presents with    Abdominal Pain     Intermittent x3 weeks    Wound Check     Near stoma, x1 month, went away and came back yesterday    Nausea    Bloated         Diverticulitis   Principal Problem:    Diverticulitis  Active Problems:    History of diverticulitis of colon    Skin ulcer ([address]    Morbid obesity ([address]    Tobacco use    S/P colostomy ([address]    Generalized abdominal pain  Resolved Problems:    * No resolved hospital problems. *       Patient examined and chart reviewed . 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 She presented to the ER with intermittent lower abdomen pain for the past 3 weeks associated with nausea. Patient also noticed open wound below the stoma and she was not able to put on the colostomy bag. She initially noted the wound 2 months ago, treated with a course of antibiotics, only healed for a few weeks and opened up again. In the ER, she was febrile 101F, tachycardic. BP stable. Labs with mild leukocytosis 11.8.   BMP normal.  CT abdomen showed complex sigmoid diverticulitis sequela with small amount of filled cavity suspicion for coloenteric fistula. She was started on IV Zosyn, IV hydration.   General surgery was consulted                 Significant last 24 hr data reviewed ;   Vitals:    02/19/20 1334 02/19/20 2003 02/19/20 2304 02/20/20 0534   BP: (!) 130/90 124/84 (!) 103/49    Pulse: 90 86 73    Resp: 17 17 16    Temp: 97.6 °F (36.4 °C) 97.4 °F (36.3 °C) 98 °F (36.7 °C)    TempSrc: Oral Oral Oral    SpO2: 98% 96% 98%    Weight:    260 lb 5.8 oz (118.1 kg)   Height:             Recent Results (from the past 24 hour(s))   CBC Auto Differential    Collection Time: 02/20/20  4:17 AM   Result Value Ref Range    WBC 5.0 3.5 - 11.0 k/uL    RBC 4.10 4.0 - 5.2 m/uL    Hemoglobin 10.6 (L) 12.0 - 16.0 g/dL    Hematocrit 32.7 (L) 36 - 46 %    MCV 79.9 (L) 80 - 100 fL    MCH 25.9 (L) 26 - 34 pg    MCHC 32.4 31 - 37 g/dL    RDW 17.4 (H) 11.5 - 14.9 %    Platelets 506 743 - 970 k/uL    MPV 8.7 6.0 - 12.0 fL    NRBC Automated NOT REPORTED per 100 WBC    Differential Type NOT REPORTED     Immature Granulocytes NOT REPORTED 0 %    Absolute Immature Granulocyte NOT REPORTED 0.00 - 0.30 k/uL    WBC Morphology NOT REPORTED     RBC Morphology NOT REPORTED     Platelet Estimate NOT REPORTED     Seg Neutrophils 67 (H) 36 - 66 %    Lymphocytes 20 (L) 24 - 44 %    Monocytes 10 (H) 1 - 7 %    Eosinophils % 2 0 - 4 %    Basophils 1 0 - 2 %    Segs Absolute 3.40 1.3 - 9.1 k/uL    Absolute Lymph # 1.00 1.0 - 4.8 k/uL    Absolute Mono # 0.50 0.1 - 1.3 k/uL    Absolute Eos # 0.10 0.0 - 0.4 k/uL    Basophils Absolute 0.00 0.0 - 0.2 k/uL   Basic Metabolic Prof    Collection Time: 02/20/20  4:17 AM   Result Value Ref Range    Glucose 123 (H) 70 - 99 mg/dL    BUN 8 6 - 20 mg/dL    CREATININE 0.81 0.50 - 0.90 mg/dL    Bun/Cre Ratio NOT REPORTED 9 - 20    Calcium 8.1 (L) 8.6 - 10.4 mg/dL    Sodium 144 135 - 144 mmol/L    Potassium 3.8 3.7 - 5.3 mmol/L    Chloride 112 (H) 98 - 107 mmol/L    CO2 24 20 - 31 mmol/L    Anion Gap 8 (L) 9 - 17 mmol/L probably reactive in nature. Peritoneum/Retroperitoneum: Prominent mesenteric nodes may be reactive to presence of fistula. No drainable fluid collection. Stable vessels. Bones/Soft Tissues: No acute osseous abnormality. Soft tissue swelling of the back. Ostomy right lower quadrant. Defect in the anterior abdominal wall adjacent to the hernia as well as on the left. These are compatible with ventral hernias. Decrease in size of the probable fistulous communication between large and small bowel in the deep pelvis. Mesenteric lymph nodes are prominent but likely reactive. Diffuse diverticulosis. Ct Abdomen Pelvis W Iv Contrast    Result Date: 2/18/2020  EXAMINATION: CT OF THE ABDOMEN AND PELVIS WITH CONTRAST 2/18/2020 5:07 pm TECHNIQUE: CT of the abdomen and pelvis was performed with the administration of intravenous contrast. Multiplanar reformatted images are provided for review. Dose modulation, iterative reconstruction, and/or weight based adjustment of the mA/kV was utilized to reduce the radiation dose to as low as reasonably achievable. COMPARISON: 04/11/2018, 03/07/2018 and 01/22/2018. HISTORY: ORDERING SYSTEM PROVIDED HISTORY: abd pain, nausea, bloating. Chronic stoma. Hx abd abscess TECHNOLOGIST PROVIDED HISTORY: IV Only Contrast abd pain, nausea, bloating. Chronic stoma. Hx abd abscess Reason for Exam: abd pain, nausea, bloating. Chronic stoma. Hx abd abscess FINDINGS: Lower Chest: Subsegmental atelectasis or scarring in the left lung base. Organs: Minimal pneumobilia and cholecystectomy are new findings in the interval.  The liver, pancreas, spleen, adrenals and kidneys reveal no acute findings. GI/Bowel: There is no bowel dilatation or wall thickening identified. Loop colostomy in the right abdomen. Status post small bowel resection in the left abdomen. Small bowel feces is noted in this region, however no significant upstream small bowel dilatation.  Pelvis: Calcified right fundal fibroid. Previously seen inflammatory change and fluid collection involving the sigmoid colon in this area has improved, however a small fluid and air containing cavity remains visible on axial image 154 measuring 7.2 x 1.3 cm. This abuts the posterior aspect of the uterus and extends 2 a loop of small bowel anteriorly on axial image 161 and sagittal image 77. No new fluid collection identified. Peritoneum/Retroperitoneum: No free air. No ascites. No lymphadenopathy. The aorta is normal in caliber. Bones/Soft Tissues: Diastasis of the abdominal wall. Multilevel degenerative change in the spine. 1.  Sequela of complicated sigmoid diverticulitis with persistent cavity with a small amount of fluid and gas in the pelvis. This is suggestive of a colo-enteric fistula. This could be further evaluated with barium enema if clinically appropriate. No new fluid collection or active diverticulitis. 2.  Findings of stasis are noted at the small bowel anastomosis in the left abdomen. No evidence for bowel obstruction. Review of Systems:   Review of Systems   Constitutional: Positive for fatigue. Negative for activity change, chills and fever. HENT: Negative for congestion. Eyes: Negative for visual disturbance. Respiratory: Negative for cough. Cardiovascular: Negative for chest pain. Gastrointestinal: Negative for abdominal pain, diarrhea, nausea and vomiting. Genitourinary: Negative for difficulty urinating and dysuria. Musculoskeletal: Negative for arthralgias. Skin: Positive for rash and wound. Neurological: Negative for dizziness and headaches. Psychiatric/Behavioral: Negative for agitation and confusion. The patient is nervous/anxious.               Interval History Status:  Patients symptoms and condition ;                            improved     Objective ;     Physical Exam   Vitals:  BP (!) 103/49   Pulse 73   Temp 98 °F (36.7 °C) (Oral)   Resp 16   Ht 5' 7.5\" (1.715 m) ([facility] Mor Brittany 02/19/2020    Morbid obesity ([address] [E66.01] 02/19/2020    Tobacco use [Z72.0] 02/19/2020    S/P colostomy ([facility] [Z93.3] 02/19/2020    Generalized abdominal pain [R10.84]     Diverticulitis [K57.92] 02/18/2020    History of diverticulitis of colon [Z87.19] 01/03/2018         CLINICAL COURSE ---          clinical course has been stable,       Plan:        Principal Problem:    Diverticulitis  Active Problems:    History of diverticulitis of colon    Skin ulcer ([facility]    Morbid obesity ([facility]    Tobacco use    S/P colostomy ([facility]    Generalized abdominal pain  Resolved Problems:    * No resolved hospital problems. *    1. Possible acute diverticulitis. History of colonic diverticulitis with perforation s/p colostomy. CT with oral and rectal contrast shows improved [place] enteric fistula  Continue IV Zosyn. IV hydration. F/U blood and urine cultures  Diet as tolerated  General surgery on board. Eventually will need colostomy revision and hernia repair  Recommended colonscopy    2. Skin wound near ostomy site. Wound care and ostomy care. 3. Lovenox 40 mg SC daily for DVT prophylaxis  4. Pepcid 20 mg IV twice daily for GI prophylaxis  Patient had sepsis on admission from cellulitis . Medications: Allergies:     Allergies   Allergen Reactions    Codeine Other (See Comments)     Causes worsening pain       Current Meds:   Scheduled Meds:    famotidine (PEPCID) injection  20 mg Intravenous BID    levothyroxine  25 mcg Oral QAM AC    sodium chloride flush  10 mL Intravenous 2 times per day    therapeutic multivitamin-minerals  1 tablet Oral Daily    vitamin C  500 mg Oral Daily    enoxaparin  30 mg Subcutaneous BID    piperacillin-tazobactam  3.375 g Intravenous Q8H     Continuous Infusions:    sodium chloride 125 mL/hr at 02/20/20 0631     PRN Meds: nicotine polacrilex, sodium chloride flush, sodium chloride flush, morphine **OR** morphine, ondansetron, acetaminophen, cyclobenzaprine, magnesium sulfate, magnesium hydroxide  Jose A Ocasio MD    Please note that this chart was generated using voice recognition Dragon dictation software. Although every effort was made to ensure the accuracy of this automated transcription, some errors in transcription may have occurred. Jose A Ocasio MD  2/20/2020    [address], [address] [place] [address]. Phone [phone]   Fax: [phone]  Answering Service: [phone] and add on       I have discussed the care of Olga Song , including pertinent history and exam findings,      2/20/20  with the resident. I have seen and examined the patient and the key elements of all parts of the encounter have been performed by me . I agree with the assessment, plan and orders as documented by the resident. Principal Problem:    Diverticulitis  Active Problems:    History of diverticulitis of colon    Skin ulcer ([address]    Morbid obesity ([facility]    Tobacco use    S/P colostomy ([facility] [address]    Generalized abdominal pain  Resolved Problems:    * No resolved hospital problems. *       --had sepsis on admission . -     ---- ;        Medications: Allergies:     Allergies   Allergen Reactions    Codeine Other (See Comments)     Causes worsening pain       Current Meds:   Scheduled Meds:    famotidine  20 mg Oral BID    levothyroxine  25 mcg Oral QAM AC    sodium chloride flush  10 mL Intravenous 2 times per day    therapeutic multivitamin-minerals  1 tablet Oral Daily    vitamin C  500 mg Oral Daily    enoxaparin  30 mg Subcutaneous BID    piperacillin-tazobactam  3.375 g Intravenous Q8H     Continuous Infusions:    sodium chloride 125 mL/hr at 02/20/20 0631     PRN Meds: nicotine polacrilex, sodium chloride flush, sodium chloride flush, morphine **OR** morphine, ondansetron, acetaminophen, cyclobenzaprine, magnesium sulfate, magnesium hydroxide        Nash Womack WAYLON MEDEIROS [address], [address] [place] [address].    Phone [phone]   Fax: [phone]  Answering Service: [phone]

## 2020-02-20 NOTE — PLAN OF CARE
Problem: Fluid Volume:  Goal: Maintenance of adequate hydration will improve  Description  Maintenance of adequate hydration will improve  Outcome: Ongoing     Problem: Pain:  Goal: Pain level will decrease  Description  Pain level will decrease  Outcome: Ongoing  Goal: Control of acute pain  Description  Control of acute pain  Outcome: Ongoing  Goal: Control of chronic pain  Description  Control of chronic pain  Outcome: Ongoing     Problem: Physical Regulation:  Goal: Prevent transmision of infection  Description  Prevent transmision of infection  Outcome: Ongoing

## 2020-02-20 NOTE — CARE COORDINATION
ONGOING DISCHARGE PLAN:    Spoke with patient regarding discharge plan and patient confirms that plan is still to return to home w/ no needs, at this time. Pt. Denies VNS. Low Fiber Diet. WBC today 5.0 from 13.0    IV Zosn continues. ?DC to home today. Will continue to follow for additional discharge needs.     Electronically signed by Fei Thorpe RN on 2/20/2020 at 1:45 PM

## 2020-02-20 NOTE — PROGRESS NOTES
[facility] Ost[facility] Continence Nurse  Follow Up      NAME:  Montana Resendiz  MEDICAL RECORD NUMBER:  [id number]  AGE: 61 y.o. GENDER: female  : 1960  TODAY'S DATE:  2020      Follow up visit today    Case discussed with care coordination regarding insurance concerns and pending Medicaid. This was discussed with the patient and made suggestions for obtaining supplies once insurance goes through. The patient states that the appliance placed yesterday worked out well and remains intact. She kept the belt on all day yesterday and it was off for the night. Further suggestion was made regarding obtaining a hernia support belt from [employer]. The patient has no further questions or concerns.     Heather BARTON, RN, [address] [place]. [address]  Wound, Ostomy, and Continence Care  [phone]

## 2020-02-20 NOTE — PROGRESS NOTES
General Surgery Progress Note    Date: 2/20/2020  Time: 8:45 AM    Tuan Hernández 61 y.o. 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 She has no complaints. Pain is controlled. Patient is tolerating oral intake. She is urinating well. She is  ambulating without difficulty. She has a colostomy bag in place with some loose stool noted. She denies abdominal pain, nausea, or vomiting today. She denies Fever/Chills, Chest Pain, SOB, Calf Pain. Vitals:  Vitals:    02/19/20 1334 02/19/20 2003 02/19/20 2304 02/20/20 0534   BP: (!) 130/90 124/84 (!) 103/49    Pulse: 90 86 73    Resp: 17 17 16    Temp: 97.6 °F (36.4 °C) 97.4 °F (36.3 °C) 98 °F (36.7 °C)    TempSrc: Oral Oral Oral    SpO2: 98% 96% 98%    Weight:    260 lb 5.8 oz (118.1 kg)   Height:            Intake/Output:   Last Shift: I/O last 3 completed shifts: In: 8015 [P.O.:360; I.V.:958; IV Piggyback:50]  Out: 500 [Urine:500]  Current Shift: No intake/output data recorded.       Physical Exam:  General:  no apparent distress, alert and cooperative  Neurologic:  alert, oriented, normal speech, no focal findings or movement disorder noted  Lungs:  No increased work of breathing, good air exchange, clear to auscultation bilaterally, no crackles or wheezing  Heart:  regular rate and rhythm and no murmur    Abdomen: soft, non-distended, nontender, no CVA tenderness, normal bowel sounds, ostomy site clean, bag in place with some loose stool noted  Extremities:  no calf tenderness, non edematous    Lab:  Recent Results (from the past 12 hour(s))   CBC Auto Differential    Collection Time: 02/20/20  4:17 AM   Result Value Ref Range    WBC 5.0 3.5 - 11.0 k/uL    RBC 4.10 4.0 - 5.2 m/uL    Hemoglobin 10.6 (L) 12.0 - 16.0 g/dL    Hematocrit 32.7 (L) 36 - 46 %    MCV 79.9 (L) 80 - 100 fL    MCH 25.9 (L) 26 - 34 pg    MCHC 32.4 31 - 37 g/dL    RDW 17.4 (H) 11.5 - 14.9 %    Platelets 471 159 - 829 k/uL    MPV 8.7 6.0 - 12.0 fL    NRBC Automated NOT REPORTED per 100 WBC    Differential Type NOT REPORTED     Immature Granulocytes NOT REPORTED 0 %    Absolute Immature Granulocyte NOT REPORTED 0.00 - 0.30 k/uL    WBC Morphology NOT REPORTED     RBC Morphology NOT REPORTED     Platelet Estimate NOT REPORTED     Seg Neutrophils 67 (H) 36 - 66 %    Lymphocytes 20 (L) 24 - 44 %    Monocytes 10 (H) 1 - 7 %    Eosinophils % 2 0 - 4 %    Basophils 1 0 - 2 %    Segs Absolute 3.40 1.3 - 9.1 k/uL    Absolute Lymph # 1.00 1.0 - 4.8 k/uL    Absolute Mono # 0.50 0.1 - 1.3 k/uL    Absolute Eos # 0.10 0.0 - 0.4 k/uL    Basophils Absolute 0.00 0.0 - 0.2 k/uL   Basic Metabolic Prof    Collection Time: 02/20/20  4:17 AM   Result Value Ref Range    Glucose 123 (H) 70 - 99 mg/dL    BUN 8 6 - 20 mg/dL    CREATININE 0.81 0.50 - 0.90 mg/dL    Bun/Cre Ratio NOT REPORTED 9 - 20    Calcium 8.1 (L) 8.6 - 10.4 mg/dL    Sodium 144 135 - 144 mmol/L    Potassium 3.8 3.7 - 5.3 mmol/L    Chloride 112 (H) 98 - 107 mmol/L    CO2 24 20 - 31 mmol/L    Anion Gap 8 (L) 9 - 17 mmol/L    GFR Non-African American >60 >60 mL/min    GFR African American >60 >60 mL/min    GFR Comment          GFR Staging NOT REPORTED        Assessment/Plan:  Valerie Gurerero 61 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   - Doing well, vitals stable, afebrile   - CT abdomen/pelvis with oral and rectal contrast demonstrates decrease in size of fistulous communication, some diffuse diverticulosis   - Continue zosyn   - Pain controlled, morphine PRN   - Leukocytosis resolved today   - Advance diet   - Encourage ambulation and use of incentive spirometer   - DVT Prhophylaxis: Lovenox 30mg BID   - GI Prophylaxis: Pepcid BID   - Stoma care follow, new bag is more comfortable for the patient   - Patient will need colonoscopy outpatient   - Eventual ostomy reversal and hernia repair outpatient      Flores Farris DO  Ob/Gyn Resident  Pager: [phone]  2/20/2020, 8:45 AM     Attending Physician Statement  I have discussed the care of [address] [place] [address], including pertinent history and exam findings with the resident/fellow. I have reviewed the key elements of all parts of the encounter with the resident/fellow. I have seen and examined the patient with the resident/fellow. I agree with the assessment and plan and status of the problem list as documented.   Additionally I recommend discharge home on oral antibiotics  Electronically signed by Melecio Boswell MD on 2/25/2020 at 11:50 AM

## 2020-02-20 NOTE — DISCHARGE INSTR - DIET

## 2020-02-21 NOTE — DISCHARGE SUMMARY
[place] [address] [facility]    Discharge Summary     Patient ID: Kyle Bob  :  1960   MRN: [id number]     ACCOUNT:  [de-identified]   Patient's PCP: Alona Bradford Date: 2020   Discharge Date: 2020   Length of Stay: 2  Code Status:  Prior  Admitting Physician: Kenya Fischer MD  Discharge Physician: Kenya Fischer MD     Active Discharge Diagnoses:     Primary Problem  Diverticulitis      Hospital Problems  Active Hospital Problems    Diagnosis Date Noted    Skin ulcer ([address] [L98.499] 2020    Morbid obesity ([address] [E66.01] 2020    Tobacco use [Z72.0] 2020    S/P colostomy ([address] [Z93.3] 2020    Generalized abdominal pain [R10.84]     Diverticulitis [K57.92] 2020    History of diverticulitis of colon [Z87.19] 2018       Admission Condition:  fair     Discharged Condition: fair    Hospital Stay:     Hospital Course:  Kyle Bob is a 61 y.o. female who was admitted for the management of   .     , presented with Abdominal Pain (Intermittent x3 weeks); Wound Check (Near stoma, x1 month, went away and came back yesterday); Nausea; and Bloated      ,                         Diverticulitis;                               Principal Problem:    Diverticulitis  Active Problems:    History of diverticulitis of colon    Skin ulcer ([address]    Morbid obesity ([address]    Tobacco use    S/P colostomy ([address]    Generalized abdominal pain  Resolved Problems:    * No resolved hospital problems. 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 She presented to the ER with intermittent lower abdomen pain for the past 3 weeks associated with nausea.   Patient also noticed open wound below the stoma and she was not able to put on the colostomy bag. She initially noted the wound 2 months ago, treated with a course of antibiotics, only healed for a few weeks and opened up again. In the ER, she was febrile 101F, tachycardic. BP stable. Labs with mild leukocytosis 11.8. BMP normal.  CT abdomen showed complex sigmoid diverticulitis sequela with small amount of filled cavity suspicion for coloenteric fistula. She was started on IV Zosyn, IV hydration. General surgery was consulted    Principal Problem:    Diverticulitis  Active Problems:    History of diverticulitis of colon    Skin ulcer ([address]    Morbid obesity ([address]    Tobacco use    S/P colostomy ([facility]    Generalized abdominal pain  Resolved Problems:    * No resolved hospital problems. *     1. Possible acute diverticulitis. History of colonic diverticulitis with perforation s/p colostomy. CT with oral and rectal contrast shows improved G[place] enteric fistula  Continue IV Zosyn. IV hydration. F/U blood and urine cultures  Diet as tolerated  General surgery on board. Eventually will need colostomy revision and hernia repair  Recommended colonscopy     2. Skin wound near ostomy site. Wound care and ostomy care. 3. Lovenox 40 mg SC daily for DVT prophylaxis  4.  Pepcid 20 mg IV twice daily for GI prophylaxis    Patient condition improved and stabilized and was discharged home     Significant Diagnostic Studies:   Labs / Micro:       Results for orders placed or performed during the hospital encounter of 02/18/20   CBC Auto Differential   Result Value Ref Range    WBC 11.8 (H) 3.5 - 11.0 k/uL    RBC 5.22 (H) 4.0 - 5.2 m/uL    Hemoglobin 13.3 12.0 - 16.0 g/dL    Hematocrit 41.8 36 - 46 %    MCV 80.0 80 - 100 fL    MCH 25.5 (L) 26 - 34 pg    MCHC 31.8 31 - 37 g/dL    RDW 17.2 (H) 11.5 - 14.9 %    Platelets 202 992 - 665 k/uL    MPV 8.8 6.0 - 12.0 fL    NRBC Automated NOT REPORTED per 100 WBC    Differential Type NOT REPORTED     Immature Granulocytes NOT TSH 16.34 (H) 0.30 - 5.00 mIU/L   T4, Free   Result Value Ref Range    Thyroxine, Free 0.74 (L) 0.93 - 1.70 ng/dL   CBC Auto Differential   Result Value Ref Range    WBC 5.0 3.5 - 11.0 k/uL    RBC 4.10 4.0 - 5.2 m/uL    Hemoglobin 10.6 (L) 12.0 - 16.0 g/dL    Hematocrit 32.7 (L) 36 - 46 %    MCV 79.9 (L) 80 - 100 fL    MCH 25.9 (L) 26 - 34 pg    MCHC 32.4 31 - 37 g/dL    RDW 17.4 (H) 11.5 - 14.9 %    Platelets 672 525 - 611 k/uL    MPV 8.7 6.0 - 12.0 fL    NRBC Automated NOT REPORTED per 100 WBC    Differential Type NOT REPORTED     Immature Granulocytes NOT REPORTED 0 %    Absolute Immature Granulocyte NOT REPORTED 0.00 - 0.30 k/uL    WBC Morphology NOT REPORTED     RBC Morphology NOT REPORTED     Platelet Estimate NOT REPORTED     Seg Neutrophils 67 (H) 36 - 66 %    Lymphocytes 20 (L) 24 - 44 %    Monocytes 10 (H) 1 - 7 %    Eosinophils % 2 0 - 4 %    Basophils 1 0 - 2 %    Segs Absolute 3.40 1.3 - 9.1 k/uL    Absolute Lymph # 1.00 1.0 - 4.8 k/uL    Absolute Mono # 0.50 0.1 - 1.3 k/uL    Absolute Eos # 0.10 0.0 - 0.4 k/uL    Basophils Absolute 0.00 0.0 - 0.2 k/uL   Basic Metabolic Prof   Result Value Ref Range    Glucose 123 (H) 70 - 99 mg/dL    BUN 8 6 - 20 mg/dL    CREATININE 0.81 0.50 - 0.90 mg/dL    Bun/Cre Ratio NOT REPORTED 9 - 20    Calcium 8.1 (L) 8.6 - 10.4 mg/dL    Sodium 144 135 - 144 mmol/L    Potassium 3.8 3.7 - 5.3 mmol/L    Chloride 112 (H) 98 - 107 mmol/L    CO2 24 20 - 31 mmol/L    Anion Gap 8 (L) 9 - 17 mmol/L    GFR Non-African American >60 >60 mL/min    GFR African American >60 >60 mL/min    GFR Comment          GFR Staging NOT REPORTED         {Radiology:    Ct Abdomen Pelvis W Iv Contrast Additional Contrast? Oral And Rectal    Result Date: 2/20/2020  EXAMINATION: CT OF THE ABDOMEN AND PELVIS WITH CONTRAST 2/19/2020 12:36 pm TECHNIQUE: CT of the abdomen and pelvis was performed with the administration of intravenous contrast. Multiplanar reformatted images are provided for review.  Dose tablet  · metroNIDAZOLE 500 MG tablet           Time spent on discharge planning ;          [] less than 30 minutes . [x]   more  than 30 minutes . Ellectronically signed by   Chai Mesa MD      Thank you Dr. Lenny Schumacher for the opportunity to be involved in this patient's care. Please note that this chart was generated using voice recognition Dragon dictation software. Although every effort was made to ensure the accuracy of this automated transcription, some errors in transcription may have occurred.

## 2020-03-11 ENCOUNTER — TELEPHONE (OUTPATIENT)
Dept: WOUND CARE | Age: 60
End: 2020-03-11

## 2020-05-27 ENCOUNTER — HOSPITAL ENCOUNTER (OUTPATIENT)
Age: 60
Setting detail: OBSERVATION
Discharge: HOME | End: 2020-05-28
Attending: EMERGENCY MEDICINE | Admitting: EMERGENCY MEDICINE
Payer: COMMERCIAL

## 2020-05-27 ENCOUNTER — APPOINTMENT (OUTPATIENT)
Dept: GENERAL RADIOLOGY | Age: 60
End: 2020-05-27
Payer: COMMERCIAL

## 2020-05-27 PROBLEM — I47.1 SVT (SUPRAVENTRICULAR TACHYCARDIA) (HCC): Status: ACTIVE | Noted: 2020-05-27

## 2020-05-27 PROBLEM — I47.10 SVT (SUPRAVENTRICULAR TACHYCARDIA): Status: ACTIVE | Noted: 2020-05-27

## 2020-05-27 LAB
-: NORMAL
ABSOLUTE EOS #: 0.2 K/UL (ref 0–0.44)
ABSOLUTE IMMATURE GRANULOCYTE: 0.1 K/UL (ref 0–0.3)
ABSOLUTE LYMPH #: 2.29 K/UL (ref 1.1–3.7)
ABSOLUTE MONO #: 0.61 K/UL (ref 0.1–1.2)
AMORPHOUS: NORMAL
ANION GAP SERPL CALCULATED.3IONS-SCNC: 18 MMOL/L (ref 9–17)
BACTERIA: NORMAL
BASOPHILS # BLD: 1 % (ref 0–2)
BASOPHILS ABSOLUTE: 0.08 K/UL (ref 0–0.2)
BILIRUBIN URINE: NEGATIVE
BUN BLDV-MCNC: 14 MG/DL (ref 6–20)
BUN/CREAT BLD: ABNORMAL (ref 9–20)
CALCIUM SERPL-MCNC: 9.6 MG/DL (ref 8.6–10.4)
CASTS UA: NORMAL /LPF (ref 0–8)
CHLORIDE BLD-SCNC: 101 MMOL/L (ref 98–107)
CO2: 19 MMOL/L (ref 20–31)
COLOR: YELLOW
COMMENT UA: ABNORMAL
CREAT SERPL-MCNC: 0.67 MG/DL (ref 0.5–0.9)
CRYSTALS, UA: NORMAL /HPF
DIFFERENTIAL TYPE: ABNORMAL
EOSINOPHILS RELATIVE PERCENT: 2 % (ref 1–4)
EPITHELIAL CELLS UA: NORMAL /HPF (ref 0–5)
GFR AFRICAN AMERICAN: >60 ML/MIN
GFR NON-AFRICAN AMERICAN: >60 ML/MIN
GFR SERPL CREATININE-BSD FRML MDRD: ABNORMAL ML/MIN/{1.73_M2}
GFR SERPL CREATININE-BSD FRML MDRD: ABNORMAL ML/MIN/{1.73_M2}
GLUCOSE BLD-MCNC: 106 MG/DL (ref 70–99)
GLUCOSE URINE: NEGATIVE
HCT VFR BLD CALC: 47.5 % (ref 36.3–47.1)
HEMOGLOBIN: 14.6 G/DL (ref 11.9–15.1)
IMMATURE GRANULOCYTES: 1 %
KETONES, URINE: ABNORMAL
LEUKOCYTE ESTERASE, URINE: ABNORMAL
LYMPHOCYTES # BLD: 19 % (ref 24–43)
MAGNESIUM: 2 MG/DL (ref 1.6–2.6)
MCH RBC QN AUTO: 25.7 PG (ref 25.2–33.5)
MCHC RBC AUTO-ENTMCNC: 30.7 G/DL (ref 28.4–34.8)
MCV RBC AUTO: 83.8 FL (ref 82.6–102.9)
MONOCYTES # BLD: 5 % (ref 3–12)
MUCUS: NORMAL
NITRITE, URINE: NEGATIVE
NRBC AUTOMATED: 0 PER 100 WBC
OTHER OBSERVATIONS UA: NORMAL
PDW BLD-RTO: 15.4 % (ref 11.8–14.4)
PH UA: 6 (ref 5–8)
PLATELET # BLD: 396 K/UL (ref 138–453)
PLATELET ESTIMATE: ABNORMAL
PMV BLD AUTO: 10.7 FL (ref 8.1–13.5)
POTASSIUM SERPL-SCNC: 4.9 MMOL/L (ref 3.7–5.3)
PROTEIN UA: NEGATIVE
RBC # BLD: 5.67 M/UL (ref 3.95–5.11)
RBC # BLD: ABNORMAL 10*6/UL
RBC UA: NORMAL /HPF (ref 0–4)
RENAL EPITHELIAL, UA: NORMAL /HPF
SEG NEUTROPHILS: 72 % (ref 36–65)
SEGMENTED NEUTROPHILS ABSOLUTE COUNT: 8.98 K/UL (ref 1.5–8.1)
SODIUM BLD-SCNC: 138 MMOL/L (ref 135–144)
SPECIFIC GRAVITY UA: 1.01 (ref 1–1.03)
TRICHOMONAS: NORMAL
TROPONIN INTERP: NORMAL
TROPONIN T: NORMAL NG/ML
TROPONIN, HIGH SENSITIVITY: <6 NG/L (ref 0–14)
TSH SERPL DL<=0.05 MIU/L-ACNC: 8 MIU/L (ref 0.3–5)
TURBIDITY: CLEAR
URINE HGB: ABNORMAL
UROBILINOGEN, URINE: NORMAL
WBC # BLD: 12.3 K/UL (ref 3.5–11.3)
WBC # BLD: ABNORMAL 10*3/UL
WBC UA: NORMAL /HPF (ref 0–5)
YEAST: NORMAL

## 2020-05-27 PROCEDURE — 2580000003 HC RX 258: Performed by: GENERAL PRACTICE

## 2020-05-27 PROCEDURE — 99285 EMERGENCY DEPT VISIT HI MDM: CPT

## 2020-05-27 PROCEDURE — 81001 URINALYSIS AUTO W/SCOPE: CPT

## 2020-05-27 PROCEDURE — 85025 COMPLETE CBC W/AUTO DIFF WBC: CPT

## 2020-05-27 PROCEDURE — 84484 ASSAY OF TROPONIN QUANT: CPT

## 2020-05-27 PROCEDURE — 80048 BASIC METABOLIC PNL TOTAL CA: CPT

## 2020-05-27 PROCEDURE — 93005 ELECTROCARDIOGRAM TRACING: CPT | Performed by: EMERGENCY MEDICINE

## 2020-05-27 PROCEDURE — 93005 ELECTROCARDIOGRAM TRACING: CPT | Performed by: GENERAL PRACTICE

## 2020-05-27 PROCEDURE — G0378 HOSPITAL OBSERVATION PER HR: HCPCS

## 2020-05-27 PROCEDURE — 84443 ASSAY THYROID STIM HORMONE: CPT

## 2020-05-27 PROCEDURE — 83735 ASSAY OF MAGNESIUM: CPT

## 2020-05-27 PROCEDURE — 6370000000 HC RX 637 (ALT 250 FOR IP): Performed by: STUDENT IN AN ORGANIZED HEALTH CARE EDUCATION/TRAINING PROGRAM

## 2020-05-27 PROCEDURE — 71045 X-RAY EXAM CHEST 1 VIEW: CPT

## 2020-05-27 RX ORDER — 0.9 % SODIUM CHLORIDE 0.9 %
1000 INTRAVENOUS SOLUTION INTRAVENOUS ONCE
Status: COMPLETED | OUTPATIENT
Start: 2020-05-27 | End: 2020-05-27

## 2020-05-27 RX ORDER — ACETAMINOPHEN 325 MG/1
650 TABLET ORAL EVERY 4 HOURS PRN
Status: DISCONTINUED | OUTPATIENT
Start: 2020-05-27 | End: 2020-05-28 | Stop reason: HOSPADM

## 2020-05-27 RX ORDER — SODIUM CHLORIDE 0.9 % (FLUSH) 0.9 %
10 SYRINGE (ML) INJECTION PRN
Status: DISCONTINUED | OUTPATIENT
Start: 2020-05-27 | End: 2020-05-28 | Stop reason: HOSPADM

## 2020-05-27 RX ORDER — UREA 10 %
3 LOTION (ML) TOPICAL NIGHTLY PRN
Status: DISCONTINUED | OUTPATIENT
Start: 2020-05-27 | End: 2020-05-28 | Stop reason: HOSPADM

## 2020-05-27 RX ORDER — SODIUM CHLORIDE 0.9 % (FLUSH) 0.9 %
10 SYRINGE (ML) INJECTION EVERY 12 HOURS SCHEDULED
Status: DISCONTINUED | OUTPATIENT
Start: 2020-05-27 | End: 2020-05-28 | Stop reason: HOSPADM

## 2020-05-27 RX ADMIN — SODIUM CHLORIDE, PRESERVATIVE FREE 10 ML: 5 INJECTION INTRAVENOUS at 22:38

## 2020-05-27 RX ADMIN — Medication 3 MG: at 22:36

## 2020-05-27 RX ADMIN — SODIUM CHLORIDE 1000 ML: 9 INJECTION, SOLUTION INTRAVENOUS at 13:02

## 2020-05-27 ASSESSMENT — ENCOUNTER SYMPTOMS
SHORTNESS OF BREATH: 0
NAUSEA: 0
CHEST TIGHTNESS: 1
COUGH: 0
VOMITING: 0

## 2020-05-27 NOTE — ED PROVIDER NOTES
[facility] ED  Emergency Department Encounter  EmergencyMedicine Resident     Pt Name:Laurie Adel Bumpers  MRN: [id number]  Birthdate 1960  Date of evaluation: 5/27/20  PCP:  [address]       Chief Complaint   Patient presents with    Shortness of Breath    Chest Pain       HISTORY OF PRESENT ILLNESS  (Location/Symptom, Timing/Onset, Context/Setting, Quality, Duration, Modifying Factors, Severity.)      Julien Wang is a 61 y.o. female who presents with with palpitations that started while driving in her car today, associated with sternal chest pressure without radiation. No prior cardiac hx. Found to be in SVT with a heart rate of 200. Ettie Barrow Converted back to NSR after two modified valsalva here in the emergency department. Patient states that she has had multiple episodes of feeling \"head pressure\", like blood was rushing to her head and palpitations with no shortness of breath and no chest pain in the past that resolve on their own. Patient has colostomy bag from prior diverticulitis and perforation several years ago. She is not complaining of any abdominal pain at this time or complications to the colostomy    PAST MEDICAL / SURGICAL / SOCIAL / FAMILY HISTORY      has a past medical history of Blood circulation, collateral and GERD (gastroesophageal reflux disease). has a past surgical history that includes Appendectomy; Abdomen surgery; hernia repair; Small intestine surgery; and Cholecystectomy.       Social History     Socioeconomic History    Marital status:      Spouse name: Not on file    Number of children: Not on file    Years of education: Not on file    Highest education level: Not on file   Occupational History    Occupation:    Social Needs    Financial resource strain: Not on file    Food insecurity     Worry: Not on file     Inability: Not on file    Transportation needs     Medical: Not on file OR/PROCEDURAL) Observation    PATIENT STATUS (FROM ED OR OR/PROCEDURAL) Observation       MEDICATIONS ORDERED:  Orders Placed This Encounter   Medications    0.9 % sodium chloride bolus           DIAGNOSTIC RESULTS / EMERGENCY DEPARTMENT COURSE / MDM     LABS:  Results for orders placed or performed during the hospital encounter of 24/36/73   Basic Metabolic Panel   Result Value Ref Range    Glucose 106 (H) 70 - 99 mg/dL    BUN 14 6 - 20 mg/dL    CREATININE 0.67 0.50 - 0.90 mg/dL    Bun/Cre Ratio NOT REPORTED 9 - 20    Calcium 9.6 8.6 - 10.4 mg/dL    Sodium 138 135 - 144 mmol/L    Potassium 4.9 3.7 - 5.3 mmol/L    Chloride 101 98 - 107 mmol/L    CO2 19 (L) 20 - 31 mmol/L    Anion Gap 18 (H) 9 - 17 mmol/L    GFR Non-African American >60 >60 mL/min    GFR African American >60 >60 mL/min    GFR Comment          GFR Staging NOT REPORTED    CBC Auto Differential   Result Value Ref Range    WBC 12.3 (H) 3.5 - 11.3 k/uL    RBC 5.67 (H) 3.95 - 5.11 m/uL    Hemoglobin 14.6 11.9 - 15.1 g/dL    Hematocrit 47.5 (H) 36.3 - 47.1 %    MCV 83.8 82.6 - 102.9 fL    MCH 25.7 25.2 - 33.5 pg    MCHC 30.7 28.4 - 34.8 g/dL    RDW 15.4 (H) 11.8 - 14.4 %    Platelets 341 978 - 355 k/uL    MPV 10.7 8.1 - 13.5 fL    NRBC Automated 0.0 0.0 per 100 WBC    Differential Type NOT REPORTED     Seg Neutrophils 72 (H) 36 - 65 %    Lymphocytes 19 (L) 24 - 43 %    Monocytes 5 3 - 12 %    Eosinophils % 2 1 - 4 %    Basophils 1 0 - 2 %    Immature Granulocytes 1 (H) 0 %    Segs Absolute 8.98 (H) 1.50 - 8.10 k/uL    Absolute Lymph # 2.29 1.10 - 3.70 k/uL    Absolute Mono # 0.61 0.10 - 1.20 k/uL    Absolute Eos # 0.20 0.00 - 0.44 k/uL    Basophils Absolute 0.08 0.00 - 0.20 k/uL    Absolute Immature Granulocyte 0.10 0.00 - 0.30 k/uL    WBC Morphology NOT REPORTED     RBC Morphology ANISOCYTOSIS PRESENT     Platelet Estimate NOT REPORTED    Troponin   Result Value Ref Range    Troponin, High Sensitivity <6 0 - 14 ng/L    Troponin T NOT REPORTED <0.03 ng/mL Troponin Interp NOT REPORTED    TSH without Reflex   Result Value Ref Range    TSH 8.00 (H) 0.30 - 5.00 mIU/L   MAGNESIUM   Result Value Ref Range    Magnesium 2.0 1.6 - 2.6 mg/dL   URINALYSIS   Result Value Ref Range    Color, UA YELLOW YELLOW    Turbidity UA CLEAR CLEAR    Glucose, Ur NEGATIVE NEGATIVE    Bilirubin Urine NEGATIVE NEGATIVE    Ketones, Urine TRACE (A) NEGATIVE    Specific Gravity, UA 1.008 1.005 - 1.030    Urine Hgb TRACE (A) NEGATIVE    pH, UA 6.0 5.0 - 8.0    Protein, UA NEGATIVE NEGATIVE    Urobilinogen, Urine Normal Normal    Nitrite, Urine NEGATIVE NEGATIVE    Leukocyte Esterase, Urine TRACE (A) NEGATIVE    Urinalysis Comments NOT REPORTED    Microscopic Urinalysis   Result Value Ref Range    -          WBC, UA 2 TO 5 0 - 5 /HPF    RBC, UA 0 TO 2 0 - 4 /HPF    Casts UA NOT REPORTED 0 - 8 /LPF    Crystals, UA NOT REPORTED None /HPF    Epithelial Cells UA 0 TO 2 0 - 5 /HPF    Renal Epithelial, UA NOT REPORTED 0 /HPF    Bacteria, UA NOT REPORTED None    Mucus, UA NOT REPORTED None    Trichomonas, UA NOT REPORTED None    Amorphous, UA NOT REPORTED None    Other Observations UA NOT REPORTED NOT REQ. Yeast, UA NOT REPORTED None       RADIOLOGY:  None    EKG  None    All EKG's are interpreted by the Emergency Department Physician who either signs or Co-signs this chart in the absence of a cardiologist.    EMERGENCY DEPARTMENT COURSE:  61 y.o. female who presents with sudden onset substernal chest tightness, palpitations, found to be in SVT. Resolution in the ED after two rounds of modified valsalva. Suspect pt may have several episodes of SVT in the past.  Admit to obs for tele monitoring, cardiology evaluation and possible holter placement                    PROCEDURES:  None    CONSULTS:  IP CONSULT TO CARDIOLOGY    CRITICAL CARE:  None    FINAL IMPRESSION      1.  Paroxysmal supraventricular tachycardia ([facility]              DISPOSITION / PLAN     DISPOSITION Admitted 05/27/2020 02:56:31

## 2020-05-28 ENCOUNTER — APPOINTMENT (OUTPATIENT)
Dept: NUCLEAR MEDICINE | Age: 60
End: 2020-05-28
Payer: COMMERCIAL

## 2020-05-28 VITALS
RESPIRATION RATE: 14 BRPM | SYSTOLIC BLOOD PRESSURE: 102 MMHG | TEMPERATURE: 97.9 F | DIASTOLIC BLOOD PRESSURE: 77 MMHG | HEART RATE: 76 BPM | OXYGEN SATURATION: 95 %

## 2020-05-28 LAB
ANION GAP SERPL CALCULATED.3IONS-SCNC: 17 MMOL/L (ref 9–17)
BNP INTERPRETATION: NORMAL
BUN BLDV-MCNC: 18 MG/DL (ref 6–20)
BUN/CREAT BLD: ABNORMAL (ref 9–20)
CALCIUM SERPL-MCNC: 8.9 MG/DL (ref 8.6–10.4)
CHLORIDE BLD-SCNC: 106 MMOL/L (ref 98–107)
CO2: 18 MMOL/L (ref 20–31)
CREAT SERPL-MCNC: 0.66 MG/DL (ref 0.5–0.9)
EKG ATRIAL RATE: 174 BPM
EKG ATRIAL RATE: 77 BPM
EKG ATRIAL RATE: 98 BPM
EKG P AXIS: 20 DEGREES
EKG P AXIS: 30 DEGREES
EKG P-R INTERVAL: 164 MS
EKG P-R INTERVAL: 200 MS
EKG Q-T INTERVAL: 240 MS
EKG Q-T INTERVAL: 318 MS
EKG Q-T INTERVAL: 370 MS
EKG QRS DURATION: 156 MS
EKG QRS DURATION: 76 MS
EKG QRS DURATION: 84 MS
EKG QTC CALCULATION (BAZETT): 405 MS
EKG QTC CALCULATION (BAZETT): 418 MS
EKG QTC CALCULATION (BAZETT): 429 MS
EKG R AXIS: 18 DEGREES
EKG R AXIS: 27 DEGREES
EKG R AXIS: 4 DEGREES
EKG T AXIS: 2 DEGREES
EKG T AXIS: 29 DEGREES
EKG T AXIS: 30 DEGREES
EKG VENTRICULAR RATE: 192 BPM
EKG VENTRICULAR RATE: 77 BPM
EKG VENTRICULAR RATE: 98 BPM
GFR AFRICAN AMERICAN: >60 ML/MIN
GFR NON-AFRICAN AMERICAN: >60 ML/MIN
GFR SERPL CREATININE-BSD FRML MDRD: ABNORMAL ML/MIN/{1.73_M2}
GFR SERPL CREATININE-BSD FRML MDRD: ABNORMAL ML/MIN/{1.73_M2}
GLUCOSE BLD-MCNC: 115 MG/DL (ref 70–99)
HCT VFR BLD CALC: 42.7 % (ref 36.3–47.1)
HEMOGLOBIN: 12.5 G/DL (ref 11.9–15.1)
LV EF: 59 %
LV EF: 66 %
LVEF MODALITY: NORMAL
LVEF MODALITY: NORMAL
MCH RBC QN AUTO: 25.4 PG (ref 25.2–33.5)
MCHC RBC AUTO-ENTMCNC: 29.3 G/DL (ref 28.4–34.8)
MCV RBC AUTO: 86.8 FL (ref 82.6–102.9)
NRBC AUTOMATED: 0 PER 100 WBC
PDW BLD-RTO: 15.7 % (ref 11.8–14.4)
PLATELET # BLD: 280 K/UL (ref 138–453)
PMV BLD AUTO: 10.9 FL (ref 8.1–13.5)
POTASSIUM SERPL-SCNC: 4.5 MMOL/L (ref 3.7–5.3)
PRO-BNP: 41 PG/ML
RBC # BLD: 4.92 M/UL (ref 3.95–5.11)
SODIUM BLD-SCNC: 141 MMOL/L (ref 135–144)
TROPONIN INTERP: NORMAL
TROPONIN T: NORMAL NG/ML
TROPONIN, HIGH SENSITIVITY: <6 NG/L (ref 0–14)
WBC # BLD: 10.3 K/UL (ref 3.5–11.3)

## 2020-05-28 PROCEDURE — 93225 XTRNL ECG REC<48 HRS REC: CPT

## 2020-05-28 PROCEDURE — 6360000002 HC RX W HCPCS: Performed by: INTERNAL MEDICINE

## 2020-05-28 PROCEDURE — 85027 COMPLETE CBC AUTOMATED: CPT

## 2020-05-28 PROCEDURE — 83880 ASSAY OF NATRIURETIC PEPTIDE: CPT

## 2020-05-28 PROCEDURE — 2580000003 HC RX 258: Performed by: INTERNAL MEDICINE

## 2020-05-28 PROCEDURE — 93010 ELECTROCARDIOGRAM REPORT: CPT | Performed by: INTERNAL MEDICINE

## 2020-05-28 PROCEDURE — 3430000000 HC RX DIAGNOSTIC RADIOPHARMACEUTICAL: Performed by: EMERGENCY MEDICINE

## 2020-05-28 PROCEDURE — 84484 ASSAY OF TROPONIN QUANT: CPT

## 2020-05-28 PROCEDURE — 93005 ELECTROCARDIOGRAM TRACING: CPT | Performed by: STUDENT IN AN ORGANIZED HEALTH CARE EDUCATION/TRAINING PROGRAM

## 2020-05-28 PROCEDURE — 78452 HT MUSCLE IMAGE SPECT MULT: CPT

## 2020-05-28 PROCEDURE — 93226 XTRNL ECG REC<48 HR SCAN A/R: CPT

## 2020-05-28 PROCEDURE — 36415 COLL VENOUS BLD VENIPUNCTURE: CPT

## 2020-05-28 PROCEDURE — 93017 CV STRESS TEST TRACING ONLY: CPT

## 2020-05-28 PROCEDURE — A9500 TC99M SESTAMIBI: HCPCS | Performed by: EMERGENCY MEDICINE

## 2020-05-28 PROCEDURE — G0378 HOSPITAL OBSERVATION PER HR: HCPCS

## 2020-05-28 PROCEDURE — 80048 BASIC METABOLIC PNL TOTAL CA: CPT

## 2020-05-28 PROCEDURE — 93306 TTE W/DOPPLER COMPLETE: CPT

## 2020-05-28 RX ORDER — AMINOPHYLLINE DIHYDRATE 25 MG/ML
50 INJECTION, SOLUTION INTRAVENOUS PRN
Status: ACTIVE | OUTPATIENT
Start: 2020-05-28 | End: 2020-05-28

## 2020-05-28 RX ORDER — SODIUM CHLORIDE 0.9 % (FLUSH) 0.9 %
10 SYRINGE (ML) INJECTION PRN
Status: ACTIVE | OUTPATIENT
Start: 2020-05-28 | End: 2020-05-28

## 2020-05-28 RX ORDER — METOPROLOL TARTRATE 5 MG/5ML
5 INJECTION INTRAVENOUS EVERY 5 MIN PRN
Status: ACTIVE | OUTPATIENT
Start: 2020-05-28 | End: 2020-05-28

## 2020-05-28 RX ORDER — ATROPINE SULFATE 0.1 MG/ML
0.5 INJECTION INTRAVENOUS EVERY 5 MIN PRN
Status: ACTIVE | OUTPATIENT
Start: 2020-05-28 | End: 2020-05-28

## 2020-05-28 RX ORDER — SODIUM CHLORIDE 9 MG/ML
500 INJECTION, SOLUTION INTRAVENOUS CONTINUOUS PRN
Status: ACTIVE | OUTPATIENT
Start: 2020-05-28 | End: 2020-05-28

## 2020-05-28 RX ORDER — NITROGLYCERIN 0.4 MG/1
0.4 TABLET SUBLINGUAL EVERY 5 MIN PRN
Status: ACTIVE | OUTPATIENT
Start: 2020-05-28 | End: 2020-05-28

## 2020-05-28 RX ADMIN — REGADENOSON 0.4 MG: 0.08 INJECTION, SOLUTION INTRAVENOUS at 11:14

## 2020-05-28 RX ADMIN — TETRAKIS(2-METHOXYISOBUTYLISOCYANIDE)COPPER(I) TETRAFLUOROBORATE 14.2 MILLICURIE: 1 INJECTION, POWDER, LYOPHILIZED, FOR SOLUTION INTRAVENOUS at 11:15

## 2020-05-28 RX ADMIN — TETRAKIS(2-METHOXYISOBUTYLISOCYANIDE)COPPER(I) TETRAFLUOROBORATE 49.5 MILLICURIE: 1 INJECTION, POWDER, LYOPHILIZED, FOR SOLUTION INTRAVENOUS at 12:25

## 2020-05-28 RX ADMIN — Medication 10 ML: at 10:57

## 2020-05-28 RX ADMIN — Medication 10 ML: at 11:14

## 2020-05-28 NOTE — PROCEDURES
[facility]                              CARDIAC STRESS TEST    PATIENT NAME: Molly Ayala                   :        1960  MED REC NO:   [id number]                             ROOM:       [id number]  ACCOUNT NO:   [de-identified]                           ADMIT DATE: 2020  PROVIDER:     Jose John STRESS STUDY    DATE OF STUDY:  2020  ORDERING PROVIDER:  Agueda Herbert  PRIMARY CARE PROVIDER:  Rachel Laurent  INDICATION: Chest pain  CONSENT:  The test was explained and consent was signed. PROTOCOL: Lexiscan, 0.4 mg infused. PREINFUSION EKG: Normal.  PREINFUSION HR: 71 bpm, infusion HR, 109 bpm.  HR response to Lexiscan  was Normal.  PREINFUSION BP: 93/70 mmHg, infusion BP, 109/67 mmHg. BP response to  Lexiscan was appropriate. CHEST PAIN:  No chest discomfort with Lexiscan nor in recovery. LEXISCAN EKG:  Normal.  ISCHEMIC EKG CHANGES: None. IMPRESSION:  Electrocardiographically negative Lexiscan stress study.   **Cardiolite report issued from the department of Nuclear Medicine**      Ashli Monae    D: 2020 14:21:54       T: 2020 14:23:39     KRISHAN/GARCIA  Job#: [id number]     Doc#: Unknown

## 2020-05-28 NOTE — CONSULTS
[facility]   Consult Note                 Date:   5/28/2020  Date of admission:  5/27/2020 12:19 PM  MRN:   [id number]  YOB: 1960    Reason for Consult: Palpitations, SVT     HISTORY OF PRESENT ILLNESS:    The patient is a 61 y.o. female who is admitted to the hospital for palpitations associated with chest pain and shortness of breath. In the ED, she was found to be in SVT and converted to NSR after valsalva maneuver. Her chest pain started after sitting for few minutes. She has no previous heart history. She has hypothyroidism and is not taking any medications for it. Past Medical History:   has a past medical history of Blood circulation, collateral and GERD (gastroesophageal reflux disease). Past Surgical History:   has a past surgical history that includes Appendectomy; Abdomen surgery; hernia repair; Small intestine surgery; and Cholecystectomy. Home Medications:    Prior to Admission medications    Medication Sig Start Date End Date Taking?  Authorizing Provider   GARLIC PO Take by mouth   Yes Historical Provider, MD   cyclobenzaprine (FLEXERIL) 5 MG tablet Take 5 mg by mouth 3 times daily as needed for Muscle spasms     Historical Provider, MD   acetaminophen (TYLENOL) 325 MG tablet Take 650 mg by mouth every 6 hours as needed for Pain    Historical Provider, MD   Multiple Vitamins-Minerals (THERAPEUTIC MULTIVITAMIN-MINERALS) tablet Take 1 tablet by mouth daily    Historical Provider, MD   vitamin C (ASCORBIC ACID) 500 MG tablet Take 500 mg by mouth daily    Historical Provider, MD   zinc gluconate 50 MG tablet Take 50 mg by mouth daily    Historical Provider, MD   Black Elderberry,Berry-Flower, 575 MG CAPS Take 1 capsule by mouth daily    Historical Provider, MD       Current Medications: Scheduled Meds:   sodium chloride flush  10 mL Intravenous 2 times per day    enoxaparin  40 mg Subcutaneous Daily     Continuous Infusions:  PRN Meds:.sodium chloride flush, acetaminophen, melatonin     Allergies:  Codeine    Social History:   reports that she has been smoking. She has never used smokeless tobacco. She reports that she does not drink alcohol or use drugs. Family History: family history includes Cancer in her maternal aunt, maternal grandmother, and mother; Coronary Art Dis in her father and mother; Diabetes in her brother, brother, and mother; Heart Disease in her brother, father, and mother; High Blood Pressure in her brother and brother; High Cholesterol in her brother and brother; Parkinsonism in her mother. Review of Systems   CONSTITUTIONAL:  negative for fevers, chills, fatigue and malaise    EYES:  negative for discharge    HEENT:  negative for epistaxis and sore throat    RESPIRATORY:  As above shortness of breath, wheezing    CARDIOVASCULAR:  As above for chest pain, palpitations, syncope, edema    GASTROINTESTINAL:  negative for nausea, vomiting, diarrhea, constipation, abdominal pain    GENITOURINARY:  negative for incontinence    MUSCULOSKELETAL:  negative for neck or back pain    NEUROLOGICAL:  negative for headaches, seizures and double vision   PSYCHIATRIC:  negative        PHYSICAL EXAM:    Blood pressure 102/77, pulse 76, temperature 97.9 °F (36.6 °C), resp. rate 14, SpO2 95 %. CONSTITUTIONAL: AOx4, no apparent distress, appears stated age   HEAD: normocephalic, atraumatic   EYES: PERRLA, EOMI   ENT: moist mucous membranes, uvula midline   NECK:  symmetric, no midline tenderness to palpation   LUNGS: clear to auscultation bilaterally   CARDIOVASCULAR: regular rate and rhythm, no murmurs, rubs or gallops   ABDOMEN: Soft, non-tender, non-distended with normal active bowel sounds   SKIN: no rash       DATA:    ECG: SVT     ECHO 2/6/18: EF 20-25%, grade III DD, mild AR/MR/TR. RVSP is 30 mmHg.      Labs:     CBC:   Recent Labs     05/27/20  1258 05/28/20  0542   WBC 12.3* 10.3   HGB 14.6 12.5   HCT 47.5* 42.7    280     BMP:   Recent Labs 05/27/20  1258 05/28/20  0542    141   K 4.9 4.5   CO2 19* 18*   BUN 14 18   CREATININE 0.67 0.66   LABGLOM >60 >60   GLUCOSE 106* 115*     IMPRESSION:    Patient Active Problem List   Diagnosis    Open abdominal wall wound    Delayed surgical wound healing    History of diverticulitis of colon    Malnutrition ([facility] [address]    Colocutaneous fistula    Abscess    Diverticulitis    Skin ulcer ([facility] [facility]    Morbid obesity (HCC)    Tobacco use    S/P colostomy (HCC)    Generalized abdominal pain    SVT (supraventricular tachycardia) ([facility])           RECOMMENDATIONS:  GIVEN THE EPISODE OF SVT FOLLOWED BY CHEST PAIN, WILL DO THE LEXISCAN STRESS TEST   CARDIAC ENZYMES NEGATIVE   WILL DO THE ECHO TO RULE OUT WALL MOTION ABNORMALITIES   ELEVATED TSH, TREATMENT PER PRIMARY TEAM       Discussed with patient and nursing.     [facility]        [phone]

## 2020-05-28 NOTE — H&P
[facility]  CDU / OBSERVATION eNCOUnter  Resident Note     Pt Name: Mirna Brower  MRN: [id number]  Caydengfarti 1960  Date of evaluation: 5/28/20  Patient's PCP is :  [address]       Chief Complaint   Patient presents with    Shortness of Breath    Chest Pain         HISTORY OF PRESENT ILLNESS    Mirna Brower is a 61 y.o. female who presented to the emergency department with lightheadedness. Patient reports having intermittent episodes of feeling a flushing sensation into her head associated with lightheadedness, but to me denies any type of chest pain or tightness or palpitations, despite his complaints in the emergency department. Patient has also been having worsening exertional dyspnea, unable to walk as far as typical for her. Patient denies chest pain, palpitations, diaphoresis, abdominal pain, nausea, vomiting, fevers, cough, congestion, dysuria, diarrhea, constipation. Patient has history of hypothyroidism, does not take Synthroid anymore secondary to side effects. Patient denies cardiac history, most recent stress test conducted over 10 years ago. Patient was found to have heart rate 200 in the emergency department, converted out with Valsalva maneuvers. Admitted to the observation unit for cardiology evaluation.     Location/Symptom: Lightheadedness   Timing/Onset: Intermittent  Provocation: Unknown  Quality: N/A  Radiation: N/A  Severity: Moderate  Timing/Duration: Minutes    Modifying Factors: Rest    REVIEW OF SYSTEMS       General ROS - No fevers, No malaise   Ophthalmic ROS - No discharge, No changes in vision  ENT ROS -  No sore throat, No rhinorrhea,   Respiratory ROS - no shortness of breath, no cough, no  wheezing  Cardiovascular ROS - No chest pain, positive dyspnea on exertion  Gastrointestinal ROS - No abdominal pain, no nausea or vomiting, no change in bowel habits, no black or bloody stools  Genito-Urinary ROS - No dysuria, (MACE)  0-3 pts indicates low risk for MACE   2.5% (DISCHARGE)   4-7 pts indicates moderate risk for MACE  20.3% (OBS)  8-10 pts indicates high risk for MACE  72.7% (EARLY INVASIVE [place])    CDU IMPRESSION/PLAN      Ayla Oneill is a 61 y.o. female who presents with    1. Acute onset SVT, uncertain etiology. Converted in the emergency department with vagal maneuvers  1. Cardiology consultation    2. Subacute dyspnea on exertion, uncertain etiology. Chest x-ray notable for mild cardiomegaly, no recent echocardiogram  1. Cardiology consultation  2. BNP pending  3. Echo    3. Chronic hypothyroidism, uncertain etiology. TSH 8, lower than patient's prior levels, discontinued Synthroid secondary to side effect profile. 1. Follow-up with primary care    ·   · IP CONSULT TO CARDIOLOGY  · Further workup and evaluation   · Follow up recommendations     · Continue home meds, pain control   · Monitor vitals, labs, imaging         CONSULTS:    IP CONSULT TO CARDIOLOGY    PROCEDURES:  Not indicated       PATIENT REFERRED TO:    Magdy Yun [address] [place] [id number]  [phone]            --  Tino Ulloa MD   Emergency Medicine Resident     This dictation was generated by voice recognition computer software. Although all attempts are made to edit the dictation for accuracy, there may be errors in the transcription that are not intended.

## 2020-05-28 NOTE — PROGRESS NOTES
[place] [facility]  Documentation Note                Admission Dx: SVT (supraventricular tachycardia) ([facility] [address] [I47.1]  SVT (supraventricular tachycardia) ([facility] [address] [I47.1]    Past Medical History:   has a past medical history of Blood circulation, collateral and GERD (gastroesophageal reflux disease). Previous Testing:     ECHO 2/6/18: EF 20-25%, grade III DD, mild AR/MR/TR. RVSP is 30 mmHg. Previous office/hospital visit:   None     Home Medications:      Prior to Admission medications    Medication Sig Start Date End Date Taking?  Authorizing Provider   GARLIC PO Take by mouth   Yes Historical Provider, MD   cyclobenzaprine (FLEXERIL) 5 MG tablet Take 5 mg by mouth 3 times daily as needed for Muscle spasms     Historical Provider, MD   acetaminophen (TYLENOL) 325 MG tablet Take 650 mg by mouth every 6 hours as needed for Pain    Historical Provider, MD   Multiple Vitamins-Minerals (THERAPEUTIC MULTIVITAMIN-MINERALS) tablet Take 1 tablet by mouth daily    Historical Provider, MD   vitamin C (ASCORBIC ACID) 500 MG tablet Take 500 mg by mouth daily    Historical Provider, MD   zinc gluconate 50 MG tablet Take 50 mg by mouth daily    Historical Provider, MD   Black Elderberry,Berry-Flower, 575 MG CAPS Take 1 capsule by mouth daily    Historical Provider, MD      Current Facility-Administered Medications: sodium chloride flush 0.9 % injection 10 mL, 10 mL, Intravenous, 2 times per day  sodium chloride flush 0.9 % injection 10 mL, 10 mL, Intravenous, PRN  acetaminophen (TYLENOL) tablet 650 mg, 650 mg, Oral, Q4H PRN  enoxaparin (LOVENOX) injection 40 mg, 40 mg, Subcutaneous, Daily  melatonin tablet 3 mg, 3 mg, Oral, Nightly PRN    Labs:     CBC:   Recent Labs     05/27/20  1258 05/28/20  0542   WBC 12.3* 10.3   HGB 14.6 12.5   HCT 47.5* 42.7    280     BMP:   Recent Labs     05/27/20  1258 05/28/20  0542    141   K 4.9 4.5   CO2 19* 18*   BUN 14 18   CREATININE 0.67 0.66   LABGLOM >60 >60

## 2020-05-28 NOTE — DISCHARGE SUMMARY
CDU Discharge Summary        Patient:  Megan Vázquez  YOB: 1960    MRN: [id number]   Acct: [de-identified]    Primary Care Physician: Mike Springer date:  5/27/2020 12:19 PM  Discharge date: 5/28/2020  6:30 PM     Discharge Diagnoses:     1.) Acute onset SVT, uncertain etiology. Converted in the emergency department with vagal maneuvers  2.) Subacute dyspnea on exertion, uncertain etiology  3.) Chronic hypothyroidism, uncertain etiology    Patient presented to the emergency department in SVT, converted in the emergency department with vagal maneuvers. Patient is also been having subacute dyspnea on exertion, admitted to the observation unit for cardiology consultation. Patient had no episodes of SVT hospitalized, stress test found EF 66%, no evidence of reversible ischemia, low risk. Patient remained stable, ready for discharge home. Patient was fitted for Holter monitor, will follow-up with primary care provider regarding further evaluation and management. Follow-up:  Call today/tomorrow for a follow up appointment with Vickie Greenberg, or return to the Emergency Room with worsening symptoms    Stressed to patient the importance of following up with primary care doctor for further workup/management of symptoms. Pt verbalizes understanding and agrees with plan.     Discharge Medication Changes:       Medication List      CONTINUE taking these medications    acetaminophen 325 MG tablet  Commonly known as:  TYLENOL     Black Elderberry(Berry-Flower) 575 MG Caps     cyclobenzaprine 5 MG tablet  Commonly known as:  FLEXERIL     GARLIC PO     therapeutic multivitamin-minerals tablet     vitamin C 500 MG tablet  Commonly known as:  ASCORBIC ACID     zinc gluconate 50 MG tablet            Diet:  No diet orders on file, advance as tolerated     Activity:  As tolerated    Consultants: IP CONSULT TO CARDIOLOGY    Procedures: Cardiac stress test    Diagnostic Test:   Results for orders placed Ur NEGATIVE NEGATIVE    Bilirubin Urine NEGATIVE NEGATIVE    Ketones, Urine TRACE (A) NEGATIVE    Specific Gravity, UA 1.008 1.005 - 1.030    Urine Hgb TRACE (A) NEGATIVE    pH, UA 6.0 5.0 - 8.0    Protein, UA NEGATIVE NEGATIVE    Urobilinogen, Urine Normal Normal    Nitrite, Urine NEGATIVE NEGATIVE    Leukocyte Esterase, Urine TRACE (A) NEGATIVE    Urinalysis Comments NOT REPORTED    Microscopic Urinalysis   Result Value Ref Range    -          WBC, UA 2 TO 5 0 - 5 /HPF    RBC, UA 0 TO 2 0 - 4 /HPF    Casts UA NOT REPORTED 0 - 8 /LPF    Crystals, UA NOT REPORTED None /HPF    Epithelial Cells UA 0 TO 2 0 - 5 /HPF    Renal Epithelial, UA NOT REPORTED 0 /HPF    Bacteria, UA NOT REPORTED None    Mucus, UA NOT REPORTED None    Trichomonas, UA NOT REPORTED None    Amorphous, UA NOT REPORTED None    Other Observations UA NOT REPORTED NOT REQ.     Yeast, UA NOT REPORTED None   CBC   Result Value Ref Range    WBC 10.3 3.5 - 11.3 k/uL    RBC 4.92 3.95 - 5.11 m/uL    Hemoglobin 12.5 11.9 - 15.1 g/dL    Hematocrit 42.7 36.3 - 47.1 %    MCV 86.8 82.6 - 102.9 fL    MCH 25.4 25.2 - 33.5 pg    MCHC 29.3 28.4 - 34.8 g/dL    RDW 15.7 (H) 11.8 - 14.4 %    Platelets 723 833 - 086 k/uL    MPV 10.9 8.1 - 13.5 fL    NRBC Automated 0.0 0.0 per 100 WBC   BASIC METABOLIC PANEL   Result Value Ref Range    Glucose 115 (H) 70 - 99 mg/dL    BUN 18 6 - 20 mg/dL    CREATININE 0.66 0.50 - 0.90 mg/dL    Bun/Cre Ratio NOT REPORTED 9 - 20    Calcium 8.9 8.6 - 10.4 mg/dL    Sodium 141 135 - 144 mmol/L    Potassium 4.5 3.7 - 5.3 mmol/L    Chloride 106 98 - 107 mmol/L    CO2 18 (L) 20 - 31 mmol/L    Anion Gap 17 9 - 17 mmol/L    GFR Non-African American >60 >60 mL/min    GFR African American >60 >60 mL/min    GFR Comment          GFR Staging NOT REPORTED    Troponin   Result Value Ref Range    Troponin, High Sensitivity <6 0 - 14 ng/L    Troponin T NOT REPORTED <0.03 ng/mL    Troponin Interp NOT REPORTED    Brain Natriuretic Peptide   Result Value Ref

## 2020-08-21 ENCOUNTER — HOSPITAL ENCOUNTER (OUTPATIENT)
Dept: WOUND CARE | Age: 60
Discharge: HOME | End: 2020-08-21
Payer: COMMERCIAL

## 2020-08-21 PROCEDURE — 99212 OFFICE O/P EST SF 10 MIN: CPT

## 2020-08-21 NOTE — CONSULTS
[facility] Continence Nurse  Initial Ostomy Clinic Visit Note       NAME:  Heydi Garcia  MEDICAL RECORD NUMBER:  [id number]  AGE: 61 y.o. GENDER: female  : 1960  TODAY'S DATE:  2020    Subjective:     Reason for Ostomy referral for eval/treatment:  Ostomy site pain and skin disruption      Heydi Garcia is a 61 y.o. female referred by:   PCP      Type of ostomy: Colostomy    Location of ostomy: RUQ    Ostomy history: colostomy created     Patient Goal of Care: skin healing        PAST MEDICAL HISTORY        Diagnosis Date    Blood circulation, collateral     GERD (gastroesophageal reflux disease)        PAST SURGICAL HISTORY    Past Surgical History:   Procedure Laterality Date    ABDOMEN SURGERY      laparotomy/bowel resection    APPENDECTOMY      CHOLECYSTECTOMY      HERNIA REPAIR      umbilical    SMALL INTESTINE SURGERY         FAMILY HISTORY    Family History   Problem Relation Age of Onset    Cancer Mother     Coronary Art Dis Mother     Diabetes Mother     Heart Disease Mother     Parkinsonism Mother     Coronary Art Dis Father     Heart Disease Father     Diabetes Brother     Heart Disease Brother     High Cholesterol Brother     High Blood Pressure Brother     Cancer Maternal Aunt     Cancer Maternal Grandmother     Diabetes Brother     High Cholesterol Brother     High Blood Pressure Brother        SOCIAL HISTORY    Social History     Tobacco Use    Smoking status: Light Tobacco Smoker    Smokeless tobacco: Never Used    Tobacco comment: 4 cigarettes a day   Substance Use Topics    Alcohol use: No    Drug use: No       ALLERGIES    Allergies   Allergen Reactions    Codeine Other (See Comments)     Causes worsening pain           Objective: There were no vitals taken for this visit.       LABS    CBC:   Lab Results   Component Value Date    WBC 10.3 2020    RBC 4.92 2020    HGB 12.5 2020     CMP:  Albumin:    Lab Results Component Value Date    LABALBU 4.1 02/18/2020     PT/INR:    Lab Results   Component Value Date    PROTIME 14.3 02/19/2020    INR 1.1 02/19/2020     HgBA1c:  No results found for: LABA1C  PTT: No components found for: LABPTT      Assessment:       Patient Active Problem List   Diagnosis Code    Open abdominal wall wound S31.109A    Delayed surgical wound healing T81.89XA    History of diverticulitis of colon Z87.19    Malnutrition ([address] E46    Colocutaneous fistula 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.2    Abscess L02.91    Diverticulitis K57.92    Skin ulcer ([address] L98.499    Morbid obesity ([address] E66.01    Tobacco use Z72.0    S/P colostomy ([address] Z93.3    Generalized abdominal pain R10.84    SVT (supraventricular tachycardia) ([address] I47.1       Patient Active Problem List   Diagnosis    Open abdominal wall wound    Delayed surgical wound healing    History of diverticulitis of colon    Malnutrition ([address]    Colocutaneous fistula    Abscess    Diverticulitis    Skin ulcer ([address]    Morbid obesity ([address]    Tobacco use    S/P colostomy ([address]    Generalized abdominal pain    SVT (supraventricular tachycardia) (HCC)         Ostomy and Peristomal skin: denuded peristomal skin with small wound found just under the 6 o'clock edge of the stoma in the same place she previously had a wound. The patient was reminded that she was instructed (by myself) to rotate through different size convex pouches in order to prevent rubbing of the convex pouch which is what had been determined to cause the wound. The patient states that she got comfortable and forgot about this. The convex ostomy pouch that she is currently uses has the deepest convex angle fitting right where the wound is present. Convexity is necessary due to stomal retraction.     Location: RUQ  Type: colostomy  Size: 1in  Height: retracted competely against skin  Color: red, pseudoverrucous lesions noted at junction  Output: formed brown  Creases/Folds: one through center of stoma  Stents/Bridges: NA      Plan:     Plan of Care: The patient was fitted with a convex pouch that has a large barrier bed to completely cover the wound area. She was taught how to cut the pouch to fit with varying angles in order to avoid extra pressure to the same area consistently. She was encouraged to use this pouch until the wound is healed and then reminded to rotate through different sized pouches in order to prevent further breakdown. Supplies to be used:    Coloplast Sensura Andrea Soft Convex two piece:  [id number] barrier  Ref# [id number] pouch    See Discharge Instructions below    Patient/Caregiver Teaching:  Level of patientunderstanding able to:     [x] Indicates understanding       [] Needs reinforcement  [] Unsuccessful      [x] Verbal Understanding  [] Demonstrated understanding       [] No evidence of learning  [] Refused teaching         [] N/A    ___________________________________________    Discharge Instructions            [address] [facility] and [address] [facility]     Visit Discharge Instructions    DATE- 8/21/2020      [address] [place] barrier  [id number] pouch      OSTOMY INSTRUCTIONS-  Routine ostomy care:  -Cut barrier to fit stoma with no more than 1/8\" of exposed skin.  -Adapt paste in a thin bead to the cut edge of the pouching system vs Apply an Adapt Barrier Ring to the cut edge of the pouching system. -Empty the pouch when 1/3 to 1/2 full.  -Change the pouching system twice weekly and prn.  -Our goal is to keep the skin around the stoma intact and to have a dependable pouching system without leaks.  -The skin around the stoma should be intact and appear just like the skin on the opposite side of the abdomen.        ADDITIONAL INSTRUCTIONS-      FOLLOW UP APPOINTMENT FOR OSTOMY CARE- CALL IF NEEDED [phone]      If you need medical attention outside of the business hours of the [address] [place] [address] Road please contact your PCP or go to the nearest emergency room.           Patient Signature:__________________________________________________ DATE__________  Electronically signed by Latisha Patterson RN on 8/21/2020 at 12:05 PM                    ____________________________________________       Electronically signed by Latisha Patterson RN on  8/21/2020 at 1:09 PM

## 2021-07-19 ENCOUNTER — HOSPITAL ENCOUNTER (INPATIENT)
Age: 61
LOS: 1 days | Discharge: HOME | DRG: 201 | End: 2021-07-20
Attending: EMERGENCY MEDICINE | Admitting: INTERNAL MEDICINE
Payer: COMMERCIAL

## 2021-07-19 ENCOUNTER — APPOINTMENT (OUTPATIENT)
Dept: GENERAL RADIOLOGY | Age: 61
DRG: 201 | End: 2021-07-19
Payer: COMMERCIAL

## 2021-07-19 DIAGNOSIS — I21.4 NSTEMI (NON-ST ELEVATED MYOCARDIAL INFARCTION) (HCC): Primary | ICD-10-CM

## 2021-07-19 PROBLEM — R09.02 OXYGEN DESATURATION: Status: ACTIVE | Noted: 2021-07-19

## 2021-07-19 PROBLEM — R77.8 ELEVATED TROPONIN: Status: ACTIVE | Noted: 2021-07-19

## 2021-07-19 PROBLEM — Z93.2 ILEOSTOMY IN PLACE (HCC): Status: ACTIVE | Noted: 2021-07-19

## 2021-07-19 PROBLEM — R79.89 ELEVATED TROPONIN: Status: ACTIVE | Noted: 2021-07-19

## 2021-07-19 PROBLEM — R00.0 TACHYCARDIA: Status: ACTIVE | Noted: 2021-07-19

## 2021-07-19 PROBLEM — E03.9 HYPOTHYROIDISM: Status: ACTIVE | Noted: 2021-07-19

## 2021-07-19 LAB
-: ABNORMAL
ABSOLUTE EOS #: 0.2 K/UL (ref 0–0.4)
ABSOLUTE IMMATURE GRANULOCYTE: ABNORMAL K/UL (ref 0–0.3)
ABSOLUTE LYMPH #: 1.6 K/UL (ref 1–4.8)
ABSOLUTE MONO #: 0.5 K/UL (ref 0.1–1.3)
ALBUMIN SERPL-MCNC: 4.1 G/DL (ref 3.5–5.2)
ALBUMIN/GLOBULIN RATIO: ABNORMAL (ref 1–2.5)
ALP BLD-CCNC: 151 U/L (ref 35–104)
ALT SERPL-CCNC: 11 U/L (ref 5–33)
AMORPHOUS: ABNORMAL
ANION GAP SERPL CALCULATED.3IONS-SCNC: 12 MMOL/L (ref 9–17)
AST SERPL-CCNC: 12 U/L
BACTERIA: ABNORMAL
BASOPHILS # BLD: 1 % (ref 0–2)
BASOPHILS ABSOLUTE: 0.1 K/UL (ref 0–0.2)
BILIRUB SERPL-MCNC: 0.43 MG/DL (ref 0.3–1.2)
BILIRUBIN URINE: NEGATIVE
BNP INTERPRETATION: NORMAL
BUN BLDV-MCNC: 12 MG/DL (ref 8–23)
BUN/CREAT BLD: ABNORMAL (ref 9–20)
CALCIUM SERPL-MCNC: 9.7 MG/DL (ref 8.6–10.4)
CASTS UA: ABNORMAL /LPF
CHLORIDE BLD-SCNC: 106 MMOL/L (ref 98–107)
CO2: 23 MMOL/L (ref 20–31)
COLOR: YELLOW
COMMENT UA: ABNORMAL
CREAT SERPL-MCNC: 0.78 MG/DL (ref 0.5–0.9)
CRYSTALS, UA: ABNORMAL /HPF
D-DIMER QUANTITATIVE: 0.49 MG/L FEU (ref 0–0.59)
DIFFERENTIAL TYPE: ABNORMAL
EOSINOPHILS RELATIVE PERCENT: 2 % (ref 0–4)
EPITHELIAL CELLS UA: ABNORMAL /HPF
GFR AFRICAN AMERICAN: >60 ML/MIN
GFR NON-AFRICAN AMERICAN: >60 ML/MIN
GFR SERPL CREATININE-BSD FRML MDRD: ABNORMAL ML/MIN/{1.73_M2}
GFR SERPL CREATININE-BSD FRML MDRD: ABNORMAL ML/MIN/{1.73_M2}
GLUCOSE BLD-MCNC: 141 MG/DL (ref 70–99)
GLUCOSE URINE: NEGATIVE
HCT VFR BLD CALC: 40.3 % (ref 36–46)
HEMOGLOBIN: 13.1 G/DL (ref 12–16)
IMMATURE GRANULOCYTES: ABNORMAL %
INR BLD: 1
KETONES, URINE: NEGATIVE
LEUKOCYTE ESTERASE, URINE: ABNORMAL
LIPASE: 22 U/L (ref 13–60)
LYMPHOCYTES # BLD: 13 % (ref 24–44)
MAGNESIUM: 2 MG/DL (ref 1.6–2.6)
MCH RBC QN AUTO: 26 PG (ref 26–34)
MCHC RBC AUTO-ENTMCNC: 32.4 G/DL (ref 31–37)
MCV RBC AUTO: 80 FL (ref 80–100)
MONOCYTES # BLD: 4 % (ref 1–7)
MUCUS: ABNORMAL
NITRITE, URINE: NEGATIVE
NRBC AUTOMATED: ABNORMAL PER 100 WBC
OTHER OBSERVATIONS UA: ABNORMAL
PARTIAL THROMBOPLASTIN TIME: 30.6 SEC (ref 24–36)
PDW BLD-RTO: 16.5 % (ref 11.5–14.9)
PH UA: 5.5 (ref 5–8)
PLATELET # BLD: 335 K/UL (ref 150–450)
PLATELET ESTIMATE: ABNORMAL
PMV BLD AUTO: 8.2 FL (ref 6–12)
POTASSIUM SERPL-SCNC: 4.4 MMOL/L (ref 3.7–5.3)
PRO-BNP: 136 PG/ML
PROTEIN UA: NEGATIVE
PROTHROMBIN TIME: 13 SEC (ref 11.8–14.6)
RBC # BLD: 5.04 M/UL (ref 4–5.2)
RBC # BLD: ABNORMAL 10*6/UL
RBC UA: ABNORMAL /HPF
RENAL EPITHELIAL, UA: ABNORMAL /HPF
SEG NEUTROPHILS: 80 % (ref 36–66)
SEGMENTED NEUTROPHILS ABSOLUTE COUNT: 9.7 K/UL (ref 1.3–9.1)
SODIUM BLD-SCNC: 141 MMOL/L (ref 135–144)
SPECIFIC GRAVITY UA: 1.02 (ref 1–1.03)
THYROXINE, FREE: 0.74 NG/DL (ref 0.93–1.7)
TOTAL PROTEIN: 7.5 G/DL (ref 6.4–8.3)
TRICHOMONAS: ABNORMAL
TROPONIN INTERP: ABNORMAL
TROPONIN T: ABNORMAL NG/ML
TROPONIN, HIGH SENSITIVITY: 16 NG/L (ref 0–14)
TROPONIN, HIGH SENSITIVITY: 20 NG/L (ref 0–14)
TROPONIN, HIGH SENSITIVITY: 21 NG/L (ref 0–14)
TSH SERPL DL<=0.05 MIU/L-ACNC: 19.54 MIU/L (ref 0.3–5)
TURBIDITY: ABNORMAL
URINE HGB: ABNORMAL
UROBILINOGEN, URINE: NORMAL
WBC # BLD: 12.1 K/UL (ref 3.5–11)
WBC # BLD: ABNORMAL 10*3/UL
WBC UA: ABNORMAL /HPF
YEAST: ABNORMAL

## 2021-07-19 PROCEDURE — 96360 HYDRATION IV INFUSION INIT: CPT

## 2021-07-19 PROCEDURE — 2580000003 HC RX 258: Performed by: EMERGENCY MEDICINE

## 2021-07-19 PROCEDURE — 99223 1ST HOSP IP/OBS HIGH 75: CPT | Performed by: INTERNAL MEDICINE

## 2021-07-19 PROCEDURE — 83735 ASSAY OF MAGNESIUM: CPT

## 2021-07-19 PROCEDURE — 71045 X-RAY EXAM CHEST 1 VIEW: CPT

## 2021-07-19 PROCEDURE — 80053 COMPREHEN METABOLIC PANEL: CPT

## 2021-07-19 PROCEDURE — 2060000000 HC ICU INTERMEDIATE R&B

## 2021-07-19 PROCEDURE — 83690 ASSAY OF LIPASE: CPT

## 2021-07-19 PROCEDURE — 99284 EMERGENCY DEPT VISIT MOD MDM: CPT

## 2021-07-19 PROCEDURE — 36415 COLL VENOUS BLD VENIPUNCTURE: CPT

## 2021-07-19 PROCEDURE — 85730 THROMBOPLASTIN TIME PARTIAL: CPT

## 2021-07-19 PROCEDURE — 81001 URINALYSIS AUTO W/SCOPE: CPT

## 2021-07-19 PROCEDURE — 85610 PROTHROMBIN TIME: CPT

## 2021-07-19 PROCEDURE — 6370000000 HC RX 637 (ALT 250 FOR IP): Performed by: INTERNAL MEDICINE

## 2021-07-19 PROCEDURE — 6360000002 HC RX W HCPCS: Performed by: EMERGENCY MEDICINE

## 2021-07-19 PROCEDURE — 6360000002 HC RX W HCPCS: Performed by: STUDENT IN AN ORGANIZED HEALTH CARE EDUCATION/TRAINING PROGRAM

## 2021-07-19 PROCEDURE — 83880 ASSAY OF NATRIURETIC PEPTIDE: CPT

## 2021-07-19 PROCEDURE — 84443 ASSAY THYROID STIM HORMONE: CPT

## 2021-07-19 PROCEDURE — 84484 ASSAY OF TROPONIN QUANT: CPT

## 2021-07-19 PROCEDURE — 6370000000 HC RX 637 (ALT 250 FOR IP): Performed by: NURSE PRACTITIONER

## 2021-07-19 PROCEDURE — 85379 FIBRIN DEGRADATION QUANT: CPT

## 2021-07-19 PROCEDURE — 85025 COMPLETE CBC W/AUTO DIFF WBC: CPT

## 2021-07-19 PROCEDURE — 93005 ELECTROCARDIOGRAM TRACING: CPT | Performed by: EMERGENCY MEDICINE

## 2021-07-19 PROCEDURE — 6370000000 HC RX 637 (ALT 250 FOR IP): Performed by: EMERGENCY MEDICINE

## 2021-07-19 PROCEDURE — 87086 URINE CULTURE/COLONY COUNT: CPT

## 2021-07-19 PROCEDURE — 84439 ASSAY OF FREE THYROXINE: CPT

## 2021-07-19 PROCEDURE — 2580000003 HC RX 258: Performed by: INTERNAL MEDICINE

## 2021-07-19 RX ORDER — DIPHENHYDRAMINE HCL 25 MG
25 TABLET ORAL NIGHTLY PRN
Status: DISCONTINUED | OUTPATIENT
Start: 2021-07-20 | End: 2021-07-19

## 2021-07-19 RX ORDER — POLYETHYLENE GLYCOL 3350 17 G/17G
17 POWDER, FOR SOLUTION ORAL DAILY PRN
Status: DISCONTINUED | OUTPATIENT
Start: 2021-07-19 | End: 2021-07-20 | Stop reason: HOSPADM

## 2021-07-19 RX ORDER — ONDANSETRON 4 MG/1
4 TABLET, ORALLY DISINTEGRATING ORAL EVERY 8 HOURS PRN
Status: DISCONTINUED | OUTPATIENT
Start: 2021-07-19 | End: 2021-07-20 | Stop reason: HOSPADM

## 2021-07-19 RX ORDER — SODIUM CHLORIDE 9 MG/ML
25 INJECTION, SOLUTION INTRAVENOUS PRN
Status: DISCONTINUED | OUTPATIENT
Start: 2021-07-19 | End: 2021-07-20 | Stop reason: HOSPADM

## 2021-07-19 RX ORDER — ONDANSETRON 2 MG/ML
4 INJECTION INTRAMUSCULAR; INTRAVENOUS EVERY 6 HOURS PRN
Status: DISCONTINUED | OUTPATIENT
Start: 2021-07-19 | End: 2021-07-20 | Stop reason: HOSPADM

## 2021-07-19 RX ORDER — ACETAMINOPHEN 325 MG/1
650 TABLET ORAL EVERY 6 HOURS PRN
Status: DISCONTINUED | OUTPATIENT
Start: 2021-07-19 | End: 2021-07-20 | Stop reason: HOSPADM

## 2021-07-19 RX ORDER — DIPHENHYDRAMINE HCL 25 MG
25 TABLET ORAL NIGHTLY PRN
Status: DISCONTINUED | OUTPATIENT
Start: 2021-07-19 | End: 2021-07-20 | Stop reason: HOSPADM

## 2021-07-19 RX ORDER — SODIUM CHLORIDE 0.9 % (FLUSH) 0.9 %
5-40 SYRINGE (ML) INJECTION PRN
Status: DISCONTINUED | OUTPATIENT
Start: 2021-07-19 | End: 2021-07-20 | Stop reason: HOSPADM

## 2021-07-19 RX ORDER — SODIUM CHLORIDE 0.9 % (FLUSH) 0.9 %
5-40 SYRINGE (ML) INJECTION EVERY 12 HOURS SCHEDULED
Status: DISCONTINUED | OUTPATIENT
Start: 2021-07-19 | End: 2021-07-20 | Stop reason: HOSPADM

## 2021-07-19 RX ORDER — LEVOTHYROXINE SODIUM 0.1 MG/1
100 TABLET ORAL DAILY
Status: DISCONTINUED | OUTPATIENT
Start: 2021-07-20 | End: 2021-07-20 | Stop reason: HOSPADM

## 2021-07-19 RX ORDER — SODIUM CHLORIDE 9 MG/ML
INJECTION, SOLUTION INTRAVENOUS CONTINUOUS
Status: DISCONTINUED | OUTPATIENT
Start: 2021-07-19 | End: 2021-07-19

## 2021-07-19 RX ORDER — ASPIRIN 81 MG/1
324 TABLET, CHEWABLE ORAL ONCE
Status: COMPLETED | OUTPATIENT
Start: 2021-07-19 | End: 2021-07-19

## 2021-07-19 RX ORDER — 0.9 % SODIUM CHLORIDE 0.9 %
500 INTRAVENOUS SOLUTION INTRAVENOUS ONCE
Status: COMPLETED | OUTPATIENT
Start: 2021-07-19 | End: 2021-07-19

## 2021-07-19 RX ORDER — ACETAMINOPHEN 650 MG/1
650 SUPPOSITORY RECTAL EVERY 6 HOURS PRN
Status: DISCONTINUED | OUTPATIENT
Start: 2021-07-19 | End: 2021-07-20 | Stop reason: HOSPADM

## 2021-07-19 RX ADMIN — ACETAMINOPHEN 650 MG: 325 TABLET ORAL at 23:57

## 2021-07-19 RX ADMIN — ASPIRIN 324 MG: 81 TABLET, CHEWABLE ORAL at 09:21

## 2021-07-19 RX ADMIN — Medication 10 ML: at 20:16

## 2021-07-19 RX ADMIN — ENOXAPARIN SODIUM 120 MG: 120 INJECTION SUBCUTANEOUS at 09:33

## 2021-07-19 RX ADMIN — ENOXAPARIN SODIUM 120 MG: 120 INJECTION SUBCUTANEOUS at 20:16

## 2021-07-19 RX ADMIN — Medication 10 ML: at 14:00

## 2021-07-19 RX ADMIN — DIPHENHYDRAMINE HCL 25 MG: 25 TABLET ORAL at 23:13

## 2021-07-19 RX ADMIN — SODIUM CHLORIDE 500 ML: 9 INJECTION, SOLUTION INTRAVENOUS at 08:28

## 2021-07-19 ASSESSMENT — ENCOUNTER SYMPTOMS
ABDOMINAL PAIN: 0
WHEEZING: 0
SHORTNESS OF BREATH: 0
COUGH: 1
SINUS PAIN: 1
VOMITING: 0
CONSTIPATION: 0
NAUSEA: 0
BACK PAIN: 0
SORE THROAT: 0
COLOR CHANGE: 0
DIARRHEA: 0
ABDOMINAL PAIN: 1
TROUBLE SWALLOWING: 0
BLOOD IN STOOL: 0
COUGH: 0

## 2021-07-19 ASSESSMENT — PAIN DESCRIPTION - DESCRIPTORS: DESCRIPTORS: ACHING

## 2021-07-19 ASSESSMENT — PAIN SCALES - GENERAL: PAINLEVEL_OUTOF10: 8

## 2021-07-19 ASSESSMENT — PAIN DESCRIPTION - PAIN TYPE: TYPE: ACUTE PAIN;CHRONIC PAIN

## 2021-07-19 ASSESSMENT — PAIN DESCRIPTION - LOCATION: LOCATION: GENERALIZED;BACK;SHOULDER

## 2021-07-19 NOTE — ED NOTES
Pt presents to ED with tachycardia. Onset 0300 this am after pt woke up from sleeping she noticed that her heart was beating fast. Pt denies any pain, pressure, SOB/NESHA, fluttering feeling in chest. Pt denies taking anything to help her heart rate. Pt called 911 this am due to rate not lowering. EMS- rate of 130, sinus tach, had pt bear down for vagal maneuvers which decreased rate to 108. Pt had previous episode 1 yr ago with no findings. Pt did also complain of abd not feeling well- denies any N/V/D, does have a colostomy bag (no complaints concerning bag/stool). Pt v/s monitored, EKG and cardiac showing S-tach no ectopy on monitor. Blood collected and sent.      Abel Urrutia  07/19/21 4947

## 2021-07-19 NOTE — PROGRESS NOTES
Nurse called residents to review admission orders and patient requesting to eat. Residents state they will be up to assess patient.

## 2021-07-19 NOTE — ED PROVIDER NOTES
Medical History:   Diagnosis Date    Blood circulation, collateral     GERD (gastroesophageal reflux disease)          SURGICAL HISTORY      has a past surgical history that includes Appendectomy; Abdomen surgery; hernia repair; Small intestine surgery; and Cholecystectomy. CURRENT MEDICATIONS       Previous Medications    ACETAMINOPHEN (TYLENOL) 325 MG TABLET    Take 650 mg by mouth every 6 hours as needed for Pain    BLACK ELDERBERRY,BERRY-FLOWER, 575 MG CAPS    Take 1 capsule by mouth daily    CYCLOBENZAPRINE (FLEXERIL) 5 MG TABLET    Take 5 mg by mouth 3 times daily as needed for Muscle spasms     GARLIC PO    Take by mouth    MULTIPLE VITAMINS-MINERALS (THERAPEUTIC MULTIVITAMIN-MINERALS) TABLET    Take 1 tablet by mouth daily    VITAMIN C (ASCORBIC ACID) 500 MG TABLET    Take 500 mg by mouth daily    ZINC GLUCONATE 50 MG TABLET    Take 50 mg by mouth daily       ALLERGIES     is allergic to codeine. FAMILY HISTORY     She indicated that the status of her mother is unknown. She indicated that the status of her father is unknown. She indicated that the status of her maternal grandmother is unknown. She indicated that the status of her maternal aunt is unknown.     family history includes Cancer in her maternal aunt, maternal grandmother, and mother; Coronary Art Dis in her father and mother; Diabetes in her brother, brother, and mother; Heart Disease in her brother, father, and mother; High Blood Pressure in her brother and brother; High Cholesterol in her brother and brother; Parkinsonism in her mother. SOCIAL HISTORY      reports that she has been smoking. She has never used smokeless tobacco. She reports that she does not drink alcohol and does not use drugs. PHYSICAL EXAM     INITIAL VITALS:  height is 5' 7\" (1.702 m) and weight is 275 lb (124.7 kg). Her oral temperature is 98.1 °F (36.7 °C). Her blood pressure is 92/65 and her pulse is 104.  Her respiration is 17 and oxygen saturation is 87% (abnormal). Physical Exam  Vitals and nursing note reviewed. Constitutional:       General: She is not in acute distress. HENT:      Head: Normocephalic and atraumatic. Eyes:      Conjunctiva/sclera: Conjunctivae normal.      Pupils: Pupils are equal, round, and reactive to light. Cardiovascular:      Rate and Rhythm: Regular rhythm. Tachycardia present. Heart sounds: Normal heart sounds. No murmur heard. Pulmonary:      Effort: Pulmonary effort is normal. No respiratory distress. Breath sounds: Normal breath sounds. Abdominal:      General: Bowel sounds are normal. There is no distension. Palpations: Abdomen is soft. Tenderness: There is no abdominal tenderness. Comments: Colostomy bag in place   Musculoskeletal:         General: No tenderness. Cervical back: Neck supple. Lymphadenopathy:      Cervical: No cervical adenopathy. Skin:     General: Skin is warm and dry. Findings: No rash. Neurological:      Mental Status: She is alert and oriented to person, place, and time. Psychiatric:         Judgment: Judgment normal.           DIFFERENTIAL DIAGNOSIS/MDM:   66-year-old female presents with palpitations and tachycardia since 3 AM today. Currently she is afebrile, nontoxic, normal vital signs other than mild tachycardia. No acute distress. Differential diagnosis includes ACS, stable versus unstable angina, PE, pneumonia, pneumothorax, dissection unlikely, dehydration, metabolic abnormality. We will get cardiac work-up, lab work.     DIAGNOSTIC RESULTS     EKG: All EKG's are interpreted by the Emergency Department Physician who either signs or Co-signs this chart in the absence of a cardiologist.    EKG Interpretation    Interpreted by me    Rhythm: Sinus tachycardia  Rate: 114  Axis: normal  Ectopy: none  Conduction: normal  ST Segments: no acute change  T Waves: no acute change  Q Waves: none    Clinical Impression: Nonspecific EKG, sinus tachycardia    RADIOLOGY:   I directly visualized the following  images and reviewed the radiologist interpretations:  XR CHEST PORTABLE   Final Result   No radiographic evidence of acute cardiopulmonary disease. ED BEDSIDE ULTRASOUND:      LABS:  Labs Reviewed   TROPONIN - Abnormal; Notable for the following components:       Result Value    Troponin, High Sensitivity 21 (*)     All other components within normal limits   CBC WITH AUTO DIFFERENTIAL - Abnormal; Notable for the following components:    WBC 12.1 (*)     RDW 16.5 (*)     Seg Neutrophils 80 (*)     Lymphocytes 13 (*)     Segs Absolute 9.70 (*)     All other components within normal limits   COMPREHENSIVE METABOLIC PANEL - Abnormal; Notable for the following components:    Glucose 141 (*)     Alkaline Phosphatase 151 (*)     All other components within normal limits   TSH WITH REFLEX - Abnormal; Notable for the following components:    TSH 19.54 (*)     All other components within normal limits   LIPASE   D-DIMER, QUANTITATIVE   BRAIN NATRIURETIC PEPTIDE   PROTIME-INR   APTT   MAGNESIUM   TROPONIN   URINE RT REFLEX TO CULTURE   T4, FREE         EMERGENCY DEPARTMENT COURSE:   Vitals:    Vitals:    07/19/21 0901 07/19/21 0907 07/19/21 0908 07/19/21 0909   BP:       Pulse: 105 108 104 104   Resp: 21 20 18 17   Temp:       TempSrc:       SpO2: 90% (!) 88% (!) 88% (!) 87%   Weight:       Height:         9:20 AM EDT  Patient does have a bump in her troponins at 21. EKG shows sinus tachycardia. D-dimer is negative. Very low suspicion for PE. Still mildly tachycardic in the low 100s. Will admit for possible non-ST elevation MI. Patient given dose of Lovenox here. Spoke with Dr. Zayda James who accepted admission. Spoke with residents and I placed admission orders.          CRITICALCARE:      CONSULTS:  IP CONSULT TO INTERNAL MEDICINE      PROCEDURES:      FINAL IMPRESSION      1. NSTEMI (non-ST elevated myocardial infarction) ([facility] DISPOSITION/PLAN   DISPOSITION Decision To Admit 07/19/2021 09:16:51 AM        PATIENT REFERRED TO:  No follow-up provider specified.     DISCHARGE MEDICATIONS:  New Prescriptions    No medications on file       (Please note that portions ofthis note were completed with a voice recognition program.  Efforts were made to edit the dictations but occasionally words are mis-transcribed.)    Anurag Vargas DO  Attending Emergency Physician    \      Anurag Vargas DO  07/19/21 6158

## 2021-07-19 NOTE — ED NOTES
Pt cardiac monitor alarming for low SPO2- 87% room air. Pt sleeping upon writers arrival, pt woken upon and reminded to take a deep breath. Pt O2 level increased. Pt denies haveing sleep apnea. Pt placed on 2L O2 NC.       Leno Good  07/19/21 2041 [facility]  07/19/21 2017

## 2021-07-19 NOTE — PROGRESS NOTES
Dr. Raisa Hall returned page. Nurse updated Dr. Raisa Hall on consult, patient condition, and labs. Dr. Raisa Hall gave orders to let patient have diet and okay to d/c iv fluids. No further testing ordered at this time. Cardiology will see patient tomorrow.

## 2021-07-19 NOTE — PROGRESS NOTES
Patient admit to room 2111. Telemetry applied, vitals obtained. Nurse oriented patient to room. Patient on 2L NC. No s/s of distress noted.

## 2021-07-19 NOTE — PROGRESS NOTES
Attestation and add on       I have discussed the care of Bridgette Escalante , including pertinent history and exam findings,    7/19/21   with the resident. I have seen and examined the patient and the key elements of all parts of the encounter have been performed by me . I agree with the assessment, plan and orders as documented by the resident. Active Problems:    NSTEMI (non-ST elevated myocardial infarction) ([address]  Resolved Problems:    * No resolved hospital problems. *          hx palpitations and near syncope. Was evaluated in [address] [place] [address] Dr stone in past for kell syncope       Discharge Diagnoses:      1.) Acute onset SVT, uncertain etiology. Converted in the emergency department with vagal maneuvers  2.) Subacute dyspnea on exertion, uncertain etiology  3.) Chronic hypothyroidism, uncertain etiology     Patient presented to the emergency department in SVT, converted in the emergency department with vagal maneuvers. Patient is also been having subacute dyspnea on exertion, admitted to the observation unit for cardiology consultation. Patient had no episodes of SVT hospitalized, stress test found EF 66%, no evidence of reversible ischemia, low risk. Patient remained stable, ready for discharge home. Patient was fitted for Holter monitor, will follow-up with primary care provider regarding further evaluation and management. Follow-up:  Call today/tomorrow for a follow up appointment with Lacey Hankins, or return to the Emergency Room with worsening symptoms         Condition    [] ill ,     [x] high risk , [] critical ,        [] improve but still labile                                        [] delirium ,      [] -----,                 [] I----     Unit  [] ICU           [x] PICU       [] MED_SRG             []  Other  Prognosis -              Medications: Allergies:     Allergies   Allergen Reactions    Codeine Other (See Comments)     Causes worsening pain       Current Meds:   Scheduled Meds:    sodium chloride flush  5-40 mL Intravenous 2 times per day    enoxaparin  1 mg/kg Subcutaneous BID     Continuous Infusions:    sodium chloride      sodium chloride       PRN Meds: sodium chloride flush, sodium chloride, ondansetron **OR** ondansetron, polyethylene glycol, acetaminophen **OR** acetaminophen      ---- ;     MD WAYLON Chang [address], [address] [place] [address].    Phone [phone]   Fax: [phone]  Answering Service: [phone]

## 2021-07-19 NOTE — ED NOTES
Mode of arrival (squad #, walk in, police, etc) : [facility]         Chief complaint(s): hearting racing         Arrival Note (brief scenario, treatment PTA, etc). : patient states around 0300 this morning she noticed her heart felt as though it was racing. Patient denies any chest pain or SOB when the tachycardia began. Patient denies the tachycardia waking her from her sleep. Patient states she has had this type of episode about 1 year ago and nothing was diagnosed. Patient continues to deny and SOB or chest pain. Patient is alert and oriented x4.         C= \"Have you ever felt that you should Cut down on your drinking? \"  No  A= \"Have people Annoyed you by criticizing your drinking? \"  No  G= \"Have you ever felt bad or Guilty about your drinking? \"  No  E= \"Have you ever had a drink as an Eye-opener first thing in the morning to steady your nerves or to help a hangover? \"  No      Deferred []      Reason for deferring: N/A    *If yes to two or more: probable alcohol abuse. Conrado Bray RN  07/19/21 4810

## 2021-07-19 NOTE — H&P
[address]      [address] [facility]     HISTORY AND PHYSICAL EXAMINATION            Date:   7/19/2021  Patient name:  Onel Soto  Date of admission:  7/19/2021  8:11 AM  MRN:   [id number]  Account:  [de-identified]  YOB: 1960  PCP:    Gerardo Su  Room:   2111/2111-01  Code Status:    Full Code    Chief Complaint:     Chief Complaint   Patient presents with    Tachycardia       History Obtained From:     patient, electronic medical record    History of Present Illness: The patient is a 60 y/o female with past medical history of diverticulitis s/p ileostomy, colo enteric fistula, hypothyroidism 2/2 Hashimoto (non-compliant to treatment d/t adverse effect of hair loss), and cardiac disorder (pt can't remember name), who presents to the ED at [facility] on 7/19 complaining of palpitations described as a fast heartbeat. The tachycardia started at about 4:30 AM when pt woke up in the night. She did not take any medications for it. Nothing makes it better or worse but pt describes it getting louder if she lays on her left side. Pt denies any associated symptoms including n/v, HA, visual disturbance, dizziness, lightheadedness. However she complains of intermittent abd pain in the RLQ and LLQ. Pt reports hx of palpitations and pre-syncopal events. Past  She states she went to [facility] as well as another hospital for these symptoms in the past and got a cardiac workup that revealed a diagnosis. Pt does not recall the name of the diagnosis but states the physician told her \"it was something that usually gets discovered in adolescence and gets fixed by adulthood. \" Stress test from 2020 shows EF 66% & no obvious fixed or reversible perfusion defect.     Pt also reports weight gain of about 40lbs in the past 1.5 years, brittle nails, increased fatigue and SOB over the past year, and cold feet. She also c/o tingling in her feet since her ileostomy surgery in 2017. In the ED, pt found to be tachycardic nand desaturating into the high 80s. Pt given supplemental O2 via NC and SpO2 increased to 90s. EKG showed sinus tachycardia. D-dimer was negative and there was low suspicion for PE. Labs remarkable for glucose 141, troponin 21 with repeat levels at 20 and 16, TSH 19.54, free thyroxine 0.74, and high WBC 12.1. CXR unremarkable. Decision was made to admit patient to PICU for management and monitoring of tachycardia and to rule out NSTEMI. Past Medical History:     Past Medical History:   Diagnosis Date    Blood circulation, collateral     GERD (gastroesophageal reflux disease)         Past SurgicalHistory:     Past Surgical History:   Procedure Laterality Date    ABDOMEN SURGERY      laparotomy/bowel resection    APPENDECTOMY      CHOLECYSTECTOMY      HERNIA REPAIR      umbilical    SMALL INTESTINE SURGERY        - ileostomy surgery in 2017      Medications Prior to Admission:        Prior to Admission medications    Medication Sig Start Date End Date Taking?  Authorizing Provider   GARLIC PO Take by mouth    Historical Provider, MD   cyclobenzaprine (FLEXERIL) 5 MG tablet Take 5 mg by mouth 3 times daily as needed for Muscle spasms     Historical Provider, MD   acetaminophen (TYLENOL) 325 MG tablet Take 650 mg by mouth every 6 hours as needed for Pain    Historical Provider, MD   Multiple Vitamins-Minerals (THERAPEUTIC MULTIVITAMIN-MINERALS) tablet Take 1 tablet by mouth daily    Historical Provider, MD   vitamin C (ASCORBIC ACID) 500 MG tablet Take 500 mg by mouth daily    Historical Provider, MD   zinc gluconate 50 MG tablet Take 50 mg by mouth daily    Historical Provider, MD   Black Elderberry,Berry-Flower, 575 MG CAPS Take 1 capsule by mouth daily    Historical Provider, MD        Allergies:     Codeine    Social History: Tobacco: according to EMR patient  reports that she has been smoking. She has never used smokeless tobacco. However, patient denies tobacco use to me. Alcohol:      reports no history of alcohol use. Drug Use:  reports no history of drug use. Family History:     Family History   Problem Relation Age of Onset   Stephanie Newsome Cancer Mother     Coronary Art Dis Mother     Diabetes Mother     Heart Disease Mother     Parkinsonism Mother     Coronary Art Dis Father     Heart Disease Father     Diabetes Brother     Heart Disease Brother     High Cholesterol Brother     High Blood Pressure Brother     Cancer Maternal Aunt     Cancer Maternal Grandmother     Diabetes Brother     High Cholesterol Brother     High Blood Pressure Brother        Review of Systems:     Positive and Negative as described in HPI. Review of Systems   Constitutional: Positive for fatigue and unexpected weight change. Negative for activity change, appetite change, chills and fever. HENT: Positive for congestion and sinus pain. Eyes: Negative for visual disturbance. Respiratory: Positive for cough. Negative for shortness of breath (generally more SOB in the past months since she has gained weight but no new SOB with episode of palpitations) and wheezing. Cardiovascular: Positive for palpitations. Negative for chest pain. Gastrointestinal: Negative for abdominal pain, nausea and vomiting. Endocrine: Positive for cold intolerance and polydipsia. Negative for heat intolerance and polyuria. Brittle nails   Genitourinary: Negative for difficulty urinating, dysuria and frequency. Musculoskeletal: Positive for arthralgias and joint swelling. Negative for back pain. Skin: Negative for color change and rash. Allergic/Immunologic: Negative for environmental allergies and food allergies. Neurological: Positive for light-headedness and numbness (Tingling sensation b/l on feet, worse on L).  Negative for dizziness, tremors and headaches. Hematological: Bruises/bleeds easily. Psychiatric/Behavioral: Positive for decreased concentration. Negative for confusion. Physical Exam:   /79   Pulse 92   Temp 97.6 °F (36.4 °C) (Oral)   Resp 18   Ht 5' 7\" (1.702 m)   Wt 283 lb 11.7 oz (128.7 kg)   SpO2 97%   BMI 44.44 kg/m²   Temp (24hrs), Av °F (36.7 °C), Min:97.6 °F (36.4 °C), Max:98.3 °F (36.8 °C)    No results for input(s): POCGLU in the last 72 hours. No intake or output data in the 24 hours ending 21 1758    Physical Exam  Constitutional:       General: She is not in acute distress. Appearance: Normal appearance. She is obese. HENT:      Head: Normocephalic and atraumatic. Comments: No thinning or balding of hair noted     Nose: Nose normal.   Eyes:      General: No scleral icterus. Conjunctiva/sclera: Conjunctivae normal.   Cardiovascular:      Rate and Rhythm: Normal rate. Pulmonary:      Effort: No respiratory distress. Breath sounds: No wheezing. Abdominal:      Palpations: Abdomen is soft. Tenderness: There is abdominal tenderness. There is no right CVA tenderness, left CVA tenderness, guarding or rebound. Hernia: A hernia is present. Comments: Ileostomy in place, no erythema or edema around ileosomy bag.   vertical scar in middle of abdomen noted   Musculoskeletal:      Right lower leg: No edema. Left lower leg: No edema. Comments: Brittle nails on hands b/l   Skin:     Coloration: Skin is not jaundiced. Neurological:      Mental Status: She is alert and oriented to person, place, and time.    Psychiatric:         Mood and Affect: Mood normal.         Behavior: Behavior normal.         Investigations:     Laboratory Testing:  Recent Results (from the past 24 hour(s))   Troponin    Collection Time: 21  8:25 AM   Result Value Ref Range    Troponin, High Sensitivity 21 (H) 0 - 14 ng/L    Troponin T NOT REPORTED <0.03 ng/mL    Troponin Interp NOT REPORTED    CBC Auto Differential    Collection Time: 07/19/21  8:25 AM   Result Value Ref Range    WBC 12.1 (H) 3.5 - 11.0 k/uL    RBC 5.04 4.0 - 5.2 m/uL    Hemoglobin 13.1 12.0 - 16.0 g/dL    Hematocrit 40.3 36 - 46 %    MCV 80.0 80 - 100 fL    MCH 26.0 26 - 34 pg    MCHC 32.4 31 - 37 g/dL    RDW 16.5 (H) 11.5 - 14.9 %    Platelets 810 423 - 301 k/uL    MPV 8.2 6.0 - 12.0 fL    NRBC Automated NOT REPORTED per 100 WBC    Differential Type NOT REPORTED     Seg Neutrophils 80 (H) 36 - 66 %    Lymphocytes 13 (L) 24 - 44 %    Monocytes 4 1 - 7 %    Eosinophils % 2 0 - 4 %    Basophils 1 0 - 2 %    Immature Granulocytes NOT REPORTED 0 %    Segs Absolute 9.70 (H) 1.3 - 9.1 k/uL    Absolute Lymph # 1.60 1.0 - 4.8 k/uL    Absolute Mono # 0.50 0.1 - 1.3 k/uL    Absolute Eos # 0.20 0.0 - 0.4 k/uL    Basophils Absolute 0.10 0.0 - 0.2 k/uL    Absolute Immature Granulocyte NOT REPORTED 0.00 - 0.30 k/uL    WBC Morphology NOT REPORTED     RBC Morphology NOT REPORTED     Platelet Estimate NOT REPORTED    Comprehensive Metabolic Panel    Collection Time: 07/19/21  8:25 AM   Result Value Ref Range    Glucose 141 (H) 70 - 99 mg/dL    BUN 12 8 - 23 mg/dL    CREATININE 0.78 0.50 - 0.90 mg/dL    Bun/Cre Ratio NOT REPORTED 9 - 20    Calcium 9.7 8.6 - 10.4 mg/dL    Sodium 141 135 - 144 mmol/L    Potassium 4.4 3.7 - 5.3 mmol/L    Chloride 106 98 - 107 mmol/L    CO2 23 20 - 31 mmol/L    Anion Gap 12 9 - 17 mmol/L    Alkaline Phosphatase 151 (H) 35 - 104 U/L    ALT 11 5 - 33 U/L    AST 12 <32 U/L    Total Bilirubin 0.43 0.3 - 1.2 mg/dL    Total Protein 7.5 6.4 - 8.3 g/dL    Albumin 4.1 3.5 - 5.2 g/dL    Albumin/Globulin Ratio NOT REPORTED 1.0 - 2.5    GFR Non-African American >60 >60 mL/min    GFR African American >60 >60 mL/min    GFR Comment          GFR Staging NOT REPORTED    Lipase    Collection Time: 07/19/21  8:25 AM   Result Value Ref Range    Lipase 22 13 - 60 U/L   D-Dimer, Quantitative    Collection Time: 07/19/21  8:25 AM Result Value Ref Range    D-Dimer, Quant 0.49 0.00 - 0.59 mg/L FEU   Brain Natriuretic Peptide    Collection Time: 07/19/21  8:25 AM   Result Value Ref Range    Pro- <300 pg/mL    BNP Interpretation Pro-BNP Reference Range:    Protime-INR    Collection Time: 07/19/21  8:25 AM   Result Value Ref Range    Protime 13.0 11.8 - 14.6 sec    INR 1.0    APTT    Collection Time: 07/19/21  8:25 AM   Result Value Ref Range    PTT 30.6 24.0 - 36.0 sec   Magnesium    Collection Time: 07/19/21  8:25 AM   Result Value Ref Range    Magnesium 2.0 1.6 - 2.6 mg/dL   TSH w/reflex to FT4    Collection Time: 07/19/21  8:25 AM   Result Value Ref Range    TSH 19.54 (H) 0.30 - 5.00 mIU/L   T4, Free    Collection Time: 07/19/21  8:25 AM   Result Value Ref Range    Thyroxine, Free 0.74 (L) 0.93 - 1.70 ng/dL   Troponin    Collection Time: 07/19/21 12:49 PM   Result Value Ref Range    Troponin, High Sensitivity 20 (H) 0 - 14 ng/L    Troponin T NOT REPORTED <0.03 ng/mL    Troponin Interp NOT REPORTED    Troponin    Collection Time: 07/19/21  3:23 PM   Result Value Ref Range    Troponin, High Sensitivity 16 (H) 0 - 14 ng/L    Troponin T NOT REPORTED <0.03 ng/mL    Troponin Interp NOT REPORTED        Imaging/Diagnostics:  XR CHEST PORTABLE    Result Date: 7/19/2021  EXAMINATION: ONE XRAY VIEW OF THE CHEST 7/19/2021 8:26 am COMPARISON: May 27, 2020 HISTORY: ORDERING SYSTEM PROVIDED HISTORY: tachycardia TECHNOLOGIST PROVIDED HISTORY: tachycardia Reason for Exam: Pt states she has a rapid heart rate this morning. Acuity: Unknown Type of Exam: Unknown FINDINGS: Mild cardiomegaly. Cardiomediastinal silhouette otherwise within normal limits. Lungs and costophrenic sulci are clear. No pneumothorax or subdiaphragmatic free air. No acute osseous abnormality identified. No radiographic evidence of acute cardiopulmonary disease.        Assessment :      Primary Problem  Tachycardia    Active Hospital Problems    Diagnosis Date Noted    NSTEMI (non-ST elevated myocardial infarction) ([facility] [address] [I21.4] 07/19/2021    Hypothyroidism [E03.9] 07/19/2021    Tachycardia [R00.0] 07/19/2021    Elevated troponin [R77.8] 07/19/2021    Oxygen desaturation [R09.02] 07/19/2021    Ileostomy in place [facility]) [Z93.2] 07/19/2021    Generalized abdominal pain [R10.84]        Plan:     Patient status Admit as inpatient in the  Progressive Unit/Step down    60 y/o F with past MHx of diverticulitis s/p ileostomy, colo enteric fistula, hypothyroidism 2/2 Hashimoto (non-compliant), and cardiac disorder of unknown etiology presented to the ED at [facility] on 7/19 complaining of palpitations and found to have tachycardia, SOB requiring 2L supplemental O2 via NC, and mildly elevated Troponins. Pt was admitted for r/o NSTEMI. Pt was seen and examined at bedside and is stable. Troponinemia & Tachycardia, r/u NSTEMI  - Trop 7/19: 21, 20, 16  - aspirin 324 mcg given in ED  - halter monitor  - consult cardio   - wean off O2 as tolerated    Hypothyroidism  - TSH 19.54, Thyroxine 0.74  - pt educated on risks of unmanaged hypothyroidism and importance of compliance with medication. Pt hesitant to take synthroid d/t past adverse effect of hair loss but willing to consider it. - start synthroid 100mcg on 7/20  - recommend f/u with OP endocrinologist    DVT ppx: enoxaparin 120 mg  Diet: NPO => can change to regular diet if OK with Cardio    Consultations:   IP CONSULT TO INTERNAL MEDICINE  IP CONSULT TO [place]  IP CONSULT TO CARDIOLOGY    Patient is admitted as inpatient status because of co-morbiditieslisted above, severity of signs and symptoms as outlined, requirement for current medical therapies and most importantly because of direct risk to patient if care not provided in a hospital setting.     Kourtney Stoner DO  7/19/2021  5:58 PM    Copy sent to Dr. Lacey Hankins

## 2021-07-20 ENCOUNTER — APPOINTMENT (OUTPATIENT)
Dept: NON INVASIVE DIAGNOSTICS | Age: 61
DRG: 201 | End: 2021-07-20
Payer: COMMERCIAL

## 2021-07-20 VITALS
DIASTOLIC BLOOD PRESSURE: 88 MMHG | BODY MASS INDEX: 44.53 KG/M2 | TEMPERATURE: 97.3 F | RESPIRATION RATE: 16 BRPM | WEIGHT: 283.73 LBS | SYSTOLIC BLOOD PRESSURE: 143 MMHG | OXYGEN SATURATION: 96 % | HEART RATE: 90 BPM | HEIGHT: 67 IN

## 2021-07-20 LAB
LV EF: 63 %
LVEF MODALITY: NORMAL

## 2021-07-20 PROCEDURE — 6370000000 HC RX 637 (ALT 250 FOR IP): Performed by: INTERNAL MEDICINE

## 2021-07-20 PROCEDURE — 93306 TTE W/DOPPLER COMPLETE: CPT

## 2021-07-20 PROCEDURE — 6370000000 HC RX 637 (ALT 250 FOR IP)

## 2021-07-20 PROCEDURE — 2580000003 HC RX 258: Performed by: INTERNAL MEDICINE

## 2021-07-20 PROCEDURE — 6360000002 HC RX W HCPCS: Performed by: STUDENT IN AN ORGANIZED HEALTH CARE EDUCATION/TRAINING PROGRAM

## 2021-07-20 PROCEDURE — 99239 HOSP IP/OBS DSCHRG MGMT >30: CPT | Performed by: INTERNAL MEDICINE

## 2021-07-20 RX ORDER — CEPHALEXIN 500 MG/1
500 CAPSULE ORAL EVERY 12 HOURS SCHEDULED
Status: DISCONTINUED | OUTPATIENT
Start: 2021-07-20 | End: 2021-07-20 | Stop reason: HOSPADM

## 2021-07-20 RX ORDER — CEPHALEXIN 500 MG/1
500 CAPSULE ORAL EVERY 12 HOURS SCHEDULED
Qty: 10 CAPSULE | Refills: 0 | Status: SHIPPED | OUTPATIENT
Start: 2021-07-20 | End: 2021-12-28

## 2021-07-20 RX ORDER — METOPROLOL SUCCINATE 25 MG/1
25 TABLET, EXTENDED RELEASE ORAL DAILY
Qty: 30 TABLET | Refills: 3 | Status: SHIPPED | OUTPATIENT
Start: 2021-07-21

## 2021-07-20 RX ORDER — METOPROLOL SUCCINATE 25 MG/1
25 TABLET, EXTENDED RELEASE ORAL DAILY
Status: DISCONTINUED | OUTPATIENT
Start: 2021-07-20 | End: 2021-07-20 | Stop reason: HOSPADM

## 2021-07-20 RX ORDER — LEVOTHYROXINE SODIUM 0.1 MG/1
100 TABLET ORAL DAILY
Qty: 30 TABLET | Refills: 3 | Status: SHIPPED | OUTPATIENT
Start: 2021-07-21

## 2021-07-20 RX ADMIN — Medication 10 ML: at 09:43

## 2021-07-20 RX ADMIN — METOPROLOL SUCCINATE 25 MG: 25 TABLET, EXTENDED RELEASE ORAL at 09:43

## 2021-07-20 RX ADMIN — ENOXAPARIN SODIUM 120 MG: 120 INJECTION SUBCUTANEOUS at 09:44

## 2021-07-20 RX ADMIN — LEVOTHYROXINE SODIUM 100 MCG: 0.1 TABLET ORAL at 07:29

## 2021-07-20 ASSESSMENT — PAIN SCALES - GENERAL: PAINLEVEL_OUTOF10: 5

## 2021-07-20 NOTE — CONSULTS
[facility]   Consult Note         Today's Date: 7/20/2021  Patient Name: Ernesto Juarez  Date of admission: 7/19/2021  8:11 AM  Patient's age: 61 y.o., 1960  Admission Dx: NSTEMI (non-ST elevated myocardial infarction) ([facility] [I21.4]    Reason for Consult:  Cardiac evaluation    Requesting Physician: Neftaly Goetz MD    REASON FOR CONSULT:  SVT, palpitations, hypothyroidism    History Obtained From:  Patient, chart, staff, records    HISTORY OF PRESENT ILLNESS:      The patient is a 61 y.o. female who is admitted to the hospital for tachycardia and palpitations. Has not been taking Synthroid due to hair loss. TSH 20. No CP and no SOB. Has hx of SVT per chart and patient. Not on meds for it. Takes herbs and supplements, but no meds. Past Medical History:   has a past medical history of Blood circulation, collateral and GERD (gastroesophageal reflux disease). Past Surgical History:   has a past surgical history that includes Appendectomy; Abdomen surgery; hernia repair; Small intestine surgery; and Cholecystectomy. Home Medications:    Prior to Admission medications    Medication Sig Start Date End Date Taking?  Authorizing Provider   GARLIC PO Take by mouth    Historical Provider, MD   cyclobenzaprine (FLEXERIL) 5 MG tablet Take 5 mg by mouth 3 times daily as needed for Muscle spasms     Historical Provider, MD   acetaminophen (TYLENOL) 325 MG tablet Take 650 mg by mouth every 6 hours as needed for Pain    Historical Provider, MD   Multiple Vitamins-Minerals (THERAPEUTIC MULTIVITAMIN-MINERALS) tablet Take 1 tablet by mouth daily    Historical Provider, MD   vitamin C (ASCORBIC ACID) 500 MG tablet Take 500 mg by mouth daily    Historical Provider, MD   zinc gluconate 50 MG tablet Take 50 mg by mouth daily    Historical Provider, MD   Black Elderberry,Berry-Flower, 575 MG CAPS Take 1 capsule by mouth daily    Historical Provider, MD       sodium chloride flush, 5-40 mL, Intravenous, 2 times per day    enoxaparin, 1 mg/kg, Subcutaneous, BID    levothyroxine, 100 mcg, Oral, Daily      Allergies:  Codeine    Social History:   reports that she has been smoking. She has never used smokeless tobacco. She reports that she does not drink alcohol and does not use drugs. Family History: family history includes Cancer in her maternal aunt, maternal grandmother, and mother; Coronary Art Dis in her father and mother; Diabetes in her brother, brother, and mother; Heart Disease in her brother, father, and mother; High Blood Pressure in her brother and brother; High Cholesterol in her brother and brother; Parkinsonism in her mother. No h/o sudden cardiac death. REVIEW OF SYSTEMS:    · Constitutional: there has been no unanticipated weight loss. There's been No change in energy level, No change in activity level. · Eyes: No visual changes or diplopia. No scleral icterus. · ENT: No Headaches, hearing loss or vertigo. No mouth sores or sore throat. · Cardiovascular: per HPI  · Respiratory: per HPI  · Gastrointestinal: No abdominal pain, appetite loss, blood in stools. No change in bowel or bladder habits. · Genitourinary: No dysuria, trouble voiding, or hematuria. · Musculoskeletal:  No gait disturbance, No weakness or joint complaints. · Integumentary: No rash or pruritis. · Neurological: No headache, diplopia, change in muscle strength, numbness or tingling. No change in gait, balance, coordination, mood, affect, memory, mentation, behavior. · Psychiatric: No anxiety, or depression. · Endocrine: No temperature intolerance. No excessive thirst, fluid intake, or urination. No tremor. · Hematologic/Lymphatic: No abnormal bruising or bleeding, blood clots or swollen lymph nodes. · Allergic/Immunologic: No nasal congestion or hives.       PHYSICAL EXAM:      /82   Pulse 95   Temp 97.3 °F (36.3 °C) (Oral)   Resp 18   Ht 5' 7\" (1.702 m)   Wt 283 lb 11.7 oz (128.7 kg) SpO2 94%   BMI 44.44 kg/m²    Constitutional and General Appearance: alert, cooperative, no distress and appears stated age  [de-identified]: PERRL, no cervical lymphadenopathy. No masses palpable. Normal oral mucosa  Respiratory:  · Normal excursion and expansion without use of accessory muscles  · Resp Auscultation: Good respiratory effort. No for increased work of breathing. On auscultation: clear to auscultation bilaterally  Cardiovascular:  · The apical impulse is not displaced  · Heart tones are crisp and normal. regular S1 and S2.  · Jugular venous pulsation Normal  · The carotid upstroke is normal in amplitude and contour without delay or bruit  · Peripheral pulses are symmetrical and full   Abdomen:   · No masses or tenderness  · Bowel sounds present  Extremities:  ·  No Cyanosis or Clubbing  ·  Lower extremity edema: No  ·  Skin: Warm and dry  Neurological:  · Alert and oriented. · Moves all extremities well  · No abnormalities of mood, affect, memory, mentation, or behavior are noted        EKG:    Date: 07/20/21  Reading: No acute ischemia      LAST ECHO:  Date:  Findings Summary:      LAST Stress Test:   Date of last ST:  Major Findings:    LAST Cardiac Angiography:.  Date:  Findings:      Labs:     CBC:   Recent Labs     07/19/21  0825   WBC 12.1*   HGB 13.1   HCT 40.3        BMP:   Recent Labs     07/19/21  0825      K 4.4   CO2 23   BUN 12   CREATININE 0.78   LABGLOM >60   GLUCOSE 141*     BNP: No results for input(s): BNP in the last 72 hours. PT/INR:   Recent Labs     07/19/21  0825   PROTIME 13.0   INR 1.0     APTT:  Recent Labs     07/19/21  0825   APTT 30.6     CARDIAC ENZYMES:No results for input(s): CKTOTAL, CKMB, CKMBINDEX, TROPONINI in the last 72 hours.   FASTING LIPID PANEL:No results found for: HDL, LDLDIRECT, LDLCALC, TRIG  LIVER PROFILE:  Recent Labs     07/19/21  0825   AST 12   ALT 11   LABALBU 4.1     Troponins: Invalid input(s): TROPONIN     Other Current Problems  Patient Active Problem List   Diagnosis    Open abdominal wall wound    Delayed surgical wound healing    History of diverticulitis of colon    Malnutrition ([address]    Colocutaneous fistula    Abscess    Diverticulitis    Skin ulcer ([address]    Morbid obesity ([address]    Tobacco use    [facility] [facility]    Generalized abdominal pain    SVT (supraventricular tachycardia) (HCC)    NSTEMI (non-ST elevated myocardial infarction) (HCC)    Hypothyroidism    Tachycardia    Elevated troponin    Oxygen desaturation    Ileostomy in place [facility])           IMPRESSION & Recommendations:      Hypothyroidism- not on meds. Counselled extensively to take meds as prescribed. Palpitations and SVT- add BB, has not been taking any meds  Multiple herbal supplements- counseled to only take FDA approved and controlled meds/scripts. Non compliance with thyroid meds due to hair loss  Reviewed last echo and stress, unremarkable. Reviewed echo while being done on her in room. Unremarkable. Ok to DC from cardiac standpoint if ok with others. Discussed with patient, family, and Nurse. Electronically signed by Kenney Simon DO on 7/20/2021 at 8:28 AM    Kenney Simon, [id number] [facility]  [employer]. [place]  [phone]

## 2021-07-20 NOTE — PLAN OF CARE
Patient has history of supraventricular tachycardia  patient was seen by Dr. Christa Johnson  this morning  she did not have any documented palpitation or supraventricular tachycardia during her stay here  Dr. Jony Figueroa input noted     IMPRESSION & Recommendations:       Hypothyroidism- not on meds. Counselled extensively to take meds as prescribed. Palpitations and SVT- add BB, has not been taking any meds  Multiple herbal supplements- counseled to only take FDA approved and controlled meds/scripts. Non compliance with thyroid meds due to hair loss  Reviewed last echo and stress, unremarkable. Reviewed echo while being done on her in room. Unremarkable. Ok to DC from cardiac standpoint if ok with others.        Discussed with patient, family, and Nurse.     Electronically signed by Ryann Montes De Oca DO on 7/20/2021 at 8:28 [facility], [id number] [facility]  [facility]. [employer]  [phone]   patient advised to follow with her PCP and try to take her medications as advised

## 2021-07-20 NOTE — DISCHARGE SUMMARY
[address]    Discharge Summary     Patient ID: Axel Keita  :  1960   MRN: [id number]     ACCOUNT:  [de-identified]   Patient's PCP: Verdis Eisenmenger Date: 2021   Discharge Date: 2021   Length of Stay: 1  Code Status:  Prior  Admitting Physician: Sweetie Sandy MD  Discharge Physician: Yesenia Caicedo DO     Active Discharge Diagnoses:       Primary Problem  7 [facility] Problems    Diagnosis Date Noted    NSTEMI (non-ST elevated myocardial infarction) ([facility] [address] [I21.4] 2021    Hypothyroidism [E03.9] 2021    Tachycardia [R00.0] 2021    Elevated troponin [R77.8] 2021    Oxygen desaturation [R09.02] 2021    Ileostomy in place [facility]) [Z93.2] 2021    Generalized abdominal pain [R10.84]        Admission Condition:  fair     Discharged Condition: fair    Hospital Stay:       Hospital Course:  Axel Keita is a 61 y.o. female who was admitted for the management of  Tachycardia   She is a 61year old female with a past medical history of diverticulitis s/p ileostomy, colo enteric fistula, hypothyroidism who presented with tachycardia. Was found to have elevated troponins. Cardiology was consulted. She was placed on a Holter monitor. Her TSH was found to be elevated at 19.54, it was found that she had not taken her synthroid for around the past 10 years. Started patient on Synthroid 100 mcg with recommendation to follow endocrinologist outpatient. It was found that the patient has a history of SVT. Seen by cardiology who added beta blocker to patient's regimen, patient had not been on any medications. On day of discharge patient is hemodynamically stable and has no other complaints. Pt U/A positive and c/o abd pain so pt also discharged on Keflex for UTI.        Significant therapeutic interventions: Beta blocker, synthroid    Significant Diagnostic Studies:     Labs / Micro:  CBC:   Lab Results   Component Value Date    WBC 12.1 07/19/2021    RBC 5.04 07/19/2021    HGB 13.1 07/19/2021    HCT 40.3 07/19/2021    MCV 80.0 07/19/2021    MCH 26.0 07/19/2021    MCHC 32.4 07/19/2021    RDW 16.5 07/19/2021     07/19/2021     BMP:    Lab Results   Component Value Date    GLUCOSE 141 07/19/2021     07/19/2021    K 4.4 07/19/2021     07/19/2021    CO2 23 07/19/2021    ANIONGAP 12 07/19/2021    BUN 12 07/19/2021    CREATININE 0.78 07/19/2021    BUNCRER NOT REPORTED 07/19/2021    CALCIUM 9.7 07/19/2021    LABGLOM >60 07/19/2021    GFRAA >60 07/19/2021    GFR      07/19/2021    GFR NOT REPORTED 07/19/2021     TSH:    Lab Results   Component Value Date    TSH 19.54 07/19/2021       Radiology:  ECHO Complete 2D W Doppler W Color    Result Date: 7/20/2021  16[facility] Transthoracic Echocardiography Report (TTE)  Patient Name Jenae De La Cruz     Date of Study               07/20/2021               Julio KAUR   Date of      1960  Gender                      Female  Birth   Age          61 year(s)  Race                           Room Number  2111        Height:                     67 inch, 170.18 cm   Corporate ID [id number]    Weight:                     283 pounds, 128.4 kg  #   Patient Acct [de-identified]   BSA:          2.34 m^2      BMI:      44.32  #                                                              kg/m^2   MR #         [id number]      Sonographer                 Jocelyne Espino   Accession #  [id number]  Interpreting Physician      Terra Wan   Fellow                   Referring Nurse                           Practitioner   Interpreting             Referring Physician         Tani Lucio  Fellow  Type of Study   TTE procedure:2D Echocardiogram, M-Mode, Doppler, Color Doppler.   Procedure Date Date: 07/20/2021 Start: 07:45 AM Study Location: [address] [place] [address] Technical Quality: Fair visualization Indications:Palpitations and Congestive heart failure. Patient Status: Inpatient Height: 67 inches Weight: 283.01 pounds BSA: 2.34 m^2 BMI: 44.32 kg/m^2 Rhythm: Within normal limits HR: 81 bpm CONCLUSIONS Summary Left ventricle is normal in size and wall thickness. Global left ventricular systolic function is normal. Estimated LV EF 60-65 %. No obvious wall motion abnormality seen. Left atrium is normal in size. Normal right ventricular size and function. No significant valvular regurgitation or stenosis seen. Signature ----------------------------------------------------------------------------  Electronically signed by Jocelyne Espino(Sonographer) on 07/20/2021 02:10  PM ---------------------------------------------------------------------------- ----------------------------------------------------------------------------  Electronically signed by Terra aWn(Interpreting physician) on  07/20/2021 02:44 PM ---------------------------------------------------------------------------- FINDINGS Left Atrium Left atrium is normal in size. Left Ventricle Left ventricle is normal in size and wall thickness. Global left ventricular systolic function is normal. Estimated LV EF 60-65 %. No obvious wall motion abnormality seen. Right Atrium Right atrium is normal in size. Right Ventricle Normal right ventricular size and function. Mitral Valve No obvious valvular abnormality seen. No evidence of mitral regurgitation. Aortic Valve No obvious valvular abnormality seen. No evidence of aortic insufficiency or stenosis. Tricuspid Valve No obvious valvular abnormality seen. Insignificant tricuspid regurgitation, unable to estimate RVSP. Pulmonic Valve No obvious valvular abnormality seen. No evidence of pulmonic insufficiency or stenosis. Pericardial Effusion No significant pericardial effusion is seen. Pleural Effusion No pleural effusion seen. Miscellaneous Normal aortic root dimension. E/E' average = 5.6.  M-mode / 2D Measurements & Calculations:   LVIDd:4.13 cm(3.7 - 5.6 cm)      Diastolic AYVUGQ:52.3 ml  PIYHO:3.29 cm(2.2 - 4.0 cm)      Systolic RZBHVF:50.9 ml  LYAP:2.03 cm(0.6 - 1.1 cm)       Aortic Root:3.7 cm(2.0 - 3.7 cm)  LVPWd:1.05 cm(0.6 - 1.1 cm)      LA Dimension: 2.5 cm(1.9 - 4.0 cm)  Fractional Shortenin.84 %    LA volume/Index: 22 ml /9m^2  Calculated LVEF (%): 73.58 %     LVOT:2.1 cm   Mitral:                              Aortic   Peak E-Wave: 0.58 m/s                Peak Velocity: 1.41 m/s  Peak A-Wave: 0.85 m/s                Mean Velocity: 0.91 m/s  E/A Ratio: 0.69                      Peak Gradient: 7.95 mmHg  Peak Gradient: 1.35 mmHg             Mean Gradient: 4 mmHg  Deceleration Time: 246 msec                                        Area (continuity): 2.52 cm^2                                       AV VTI: 23.8 cm  Septal Wall E' velocity:0.11 m/s Lateral Wall E' velocity:0.10 m/s    XR CHEST PORTABLE    Result Date: 2021  EXAMINATION: ONE XRAY VIEW OF THE CHEST 2021 8:26 am COMPARISON: May 27, 2020 HISTORY: ORDERING SYSTEM PROVIDED HISTORY: tachycardia TECHNOLOGIST PROVIDED HISTORY: tachycardia Reason for Exam: Pt states she has a rapid heart rate this morning. Acuity: Unknown Type of Exam: Unknown FINDINGS: Mild cardiomegaly. Cardiomediastinal silhouette otherwise within normal limits. Lungs and costophrenic sulci are clear. No pneumothorax or subdiaphragmatic free air. No acute osseous abnormality identified. No radiographic evidence of acute cardiopulmonary disease. Consultations:    Consults:     Final Specialist Recommendations/Findings:   IP CONSULT TO INTERNAL MEDICINE  IP CONSULT TO SPIRITUAL SERVICES  IP CONSULT TO CARDIOLOGY      The patient was seen and examined on day of discharge and this discharge summary is in conjunction with any daily progress note from day of discharge.     Discharge plan:       Disposition: Home    Physician Follow Up:     Carla Wilcox summary of  Colonel Phi Reece AS NEEDED  ,   including pertinent history and exam findings. I have personally seen the patient and participated in discharge planning and evaluation . I have reviewed the key elements of all parts of the discharge summary . I agree with the information and plans as documented by the resident. Time spent on discharge planning ;          [] less than 30 minutes . [x]   more  than 30 minutes . Electronically signed by Dank Franco MD on 7/20/2021 .

## 2021-07-20 NOTE — PLAN OF CARE
RESIDENT TEAM SIGN OFF    Ms. Renetta Elizabeth is a 60 y/o F with past MHx of diverticulitis s/p ileostomy, colo enteric fistula, hypothyroidism 2/2 Hashimoto (non-compliant), and cardiac disorder of unknown etiology who was admitted for tachycardia and increased troponins. Pt has a hx of similar palpitations and pre-syncopal event. Reports she has had cardiac workup in the past but can't remember name of findings. Pt given supplemental O2 via [place] ed and SpO2 increased to 90s. EKG showed sinus tachycardia. D-dimer was negative. ., troponin 21 with repeat levels at 20 and 16, TSH 19.54, free thyroxine 0.74. Pt admitted to PICU & placed on halter monitor.

## 2021-07-20 NOTE — PLAN OF CARE
Problem: Pain:  Goal: Pain level will decrease  Description: Pain level will decrease  Outcome: Ongoing  Note: Generalized aches and pains. Medicated needs. Non pharmaceutical interventions applied      Problem: Pain:  Goal: Control of acute pain  Description: Control of acute pain  Outcome: Ongoing  Note: Generalized aches and pains. Medicated needs. Non pharmaceutical interventions applied      Problem: Pain:  Goal: Control of chronic pain  Description: Control of chronic pain  Outcome: Ongoing  Note: Generalized aches and pains. Medicated needs.  Non pharmaceutical interventions applied

## 2021-07-21 LAB
CULTURE: NORMAL
EKG ATRIAL RATE: 114 BPM
EKG P AXIS: 51 DEGREES
EKG P-R INTERVAL: 152 MS
EKG Q-T INTERVAL: 308 MS
EKG QRS DURATION: 76 MS
EKG QTC CALCULATION (BAZETT): 424 MS
EKG R AXIS: 17 DEGREES
EKG T AXIS: 36 DEGREES
EKG VENTRICULAR RATE: 114 BPM
Lab: NORMAL
SPECIMEN DESCRIPTION: NORMAL

## 2021-07-21 PROCEDURE — 93010 ELECTROCARDIOGRAM REPORT: CPT | Performed by: INTERNAL MEDICINE

## 2021-08-18 PROBLEM — R77.8 ELEVATED TROPONIN: Status: RESOLVED | Noted: 2021-07-19 | Resolved: 2021-08-18

## 2021-08-18 PROBLEM — R79.89 ELEVATED TROPONIN: Status: RESOLVED | Noted: 2021-07-19 | Resolved: 2021-08-18

## 2021-12-07 ENCOUNTER — HOSPITAL ENCOUNTER (OUTPATIENT)
Dept: CT IMAGING | Age: 61
Discharge: HOME | End: 2021-12-09
Payer: COMMERCIAL

## 2021-12-07 VITALS
HEART RATE: 66 BPM | OXYGEN SATURATION: 94 % | SYSTOLIC BLOOD PRESSURE: 106 MMHG | DIASTOLIC BLOOD PRESSURE: 50 MMHG | RESPIRATION RATE: 20 BRPM

## 2021-12-07 DIAGNOSIS — R06.09 OTHER FORM OF DYSPNEA: ICD-10-CM

## 2021-12-07 DIAGNOSIS — R00.0 SINUS TACHYCARDIA: ICD-10-CM

## 2021-12-07 LAB
GFR NON-AFRICAN AMERICAN: >60 ML/MIN
GFR SERPL CREATININE-BSD FRML MDRD: >60 ML/MIN
GFR SERPL CREATININE-BSD FRML MDRD: NORMAL ML/MIN/{1.73_M2}
POC CREATININE: 0.64 MG/DL (ref 0.51–1.19)

## 2021-12-07 PROCEDURE — 82565 ASSAY OF CREATININE: CPT

## 2021-12-07 PROCEDURE — 6360000004 HC RX CONTRAST MEDICATION: Performed by: INTERNAL MEDICINE

## 2021-12-07 PROCEDURE — 2500000003 HC RX 250 WO HCPCS: Performed by: INTERNAL MEDICINE

## 2021-12-07 PROCEDURE — 75574 CT ANGIO HRT W/3D IMAGE: CPT

## 2021-12-07 PROCEDURE — 2580000003 HC RX 258: Performed by: INTERNAL MEDICINE

## 2021-12-07 PROCEDURE — 6370000000 HC RX 637 (ALT 250 FOR IP): Performed by: INTERNAL MEDICINE

## 2021-12-07 RX ORDER — METOPROLOL TARTRATE 5 MG/5ML
2.5 INJECTION INTRAVENOUS EVERY 5 MIN PRN
Status: DISCONTINUED | OUTPATIENT
Start: 2021-12-07 | End: 2021-12-10 | Stop reason: HOSPADM

## 2021-12-07 RX ORDER — NITROGLYCERIN 0.4 MG/1
0.4 TABLET SUBLINGUAL ONCE
Status: COMPLETED | OUTPATIENT
Start: 2021-12-07 | End: 2021-12-07

## 2021-12-07 RX ORDER — SODIUM CHLORIDE 0.9 % (FLUSH) 0.9 %
10 SYRINGE (ML) INJECTION PRN
Status: DISCONTINUED | OUTPATIENT
Start: 2021-12-07 | End: 2021-12-10 | Stop reason: HOSPADM

## 2021-12-07 RX ORDER — SODIUM CHLORIDE 9 MG/ML
INJECTION, SOLUTION INTRAVENOUS CONTINUOUS
Status: DISCONTINUED | OUTPATIENT
Start: 2021-12-07 | End: 2021-12-10 | Stop reason: HOSPADM

## 2021-12-07 RX ORDER — 0.9 % SODIUM CHLORIDE 0.9 %
80 INTRAVENOUS SOLUTION INTRAVENOUS ONCE
Status: COMPLETED | OUTPATIENT
Start: 2021-12-07 | End: 2021-12-07

## 2021-12-07 RX ORDER — METOPROLOL TARTRATE 50 MG/1
100 TABLET, FILM COATED ORAL ONCE
Status: COMPLETED | OUTPATIENT
Start: 2021-12-07 | End: 2021-12-07

## 2021-12-07 RX ADMIN — METOPROLOL TARTRATE 2.5 MG: 1 INJECTION, SOLUTION INTRAVENOUS at 11:12

## 2021-12-07 RX ADMIN — NITROGLYCERIN 0.4 MG: 0.4 TABLET, ORALLY DISINTEGRATING SUBLINGUAL at 11:29

## 2021-12-07 RX ADMIN — SODIUM CHLORIDE 80 ML: 9 INJECTION, SOLUTION INTRAVENOUS at 12:00

## 2021-12-07 RX ADMIN — IOPAMIDOL 100 ML: 755 INJECTION, SOLUTION INTRAVENOUS at 12:00

## 2021-12-07 RX ADMIN — SODIUM CHLORIDE, PRESERVATIVE FREE 10 ML: 5 INJECTION INTRAVENOUS at 12:00

## 2021-12-07 RX ADMIN — METOPROLOL TARTRATE 100 MG: 100 TABLET, FILM COATED ORAL at 10:15

## 2021-12-07 RX ADMIN — METOPROLOL TARTRATE 2.5 MG: 1 INJECTION, SOLUTION INTRAVENOUS at 11:26

## 2021-12-07 RX ADMIN — SODIUM CHLORIDE: 9 INJECTION, SOLUTION INTRAVENOUS at 11:16

## 2021-12-28 ENCOUNTER — HOSPITAL ENCOUNTER (OUTPATIENT)
Dept: WOUND CARE | Age: 61
Discharge: HOME | End: 2021-12-28
Payer: COMMERCIAL

## 2021-12-28 VITALS
DIASTOLIC BLOOD PRESSURE: 88 MMHG | SYSTOLIC BLOOD PRESSURE: 124 MMHG | HEART RATE: 94 BPM | RESPIRATION RATE: 18 BRPM | TEMPERATURE: 96.7 F

## 2021-12-28 PROCEDURE — 99214 OFFICE O/P EST MOD 30 MIN: CPT

## 2021-12-28 ASSESSMENT — PAIN DESCRIPTION - LOCATION: LOCATION: ABDOMEN

## 2021-12-28 ASSESSMENT — PAIN SCALES - GENERAL: PAINLEVEL_OUTOF10: 2

## 2021-12-28 ASSESSMENT — PAIN DESCRIPTION - PAIN TYPE: TYPE: CHRONIC PAIN

## 2021-12-28 NOTE — PROGRESS NOTES
[facility]        NAME:  Summer Bird  MEDICAL RECORD NUMBER:  [id number]  AGE: 64 y.o. GENDER: female  : 1960  TODAY'S DATE:  2021      Spoke with Erik Kanner and attempted to order the following products:    -NuHope hernia belt 8\", ref# [id number]    -NuHope Convex pouch, ref# [id number] ([facility] [id number], )    -Brava strip paste, ref# [id number] ([facility] )    -Brava Elastic Barrier strips, ref# [id number] ([facility] )    The patient's insurance will not cover the NuHope pouches due to it not being on their list of acceptable products. Francetta Sake Medicaid may allow for product with prior authorization. The NuHope pouch is necessary as it is the only pouch of it's type that will properly fit the ileostomy due to nearby peristomal hernia, loose skin, and due to retracted stoma being situated in a cavernous area on the abdomen. The NuHope shape will fit into the area to create a better seal as previously used pouches frequently leak.             Prabhu Guthrie MSN RN, CWS, [facility] [address] [place] [address]  Wound, Ostomy, and Continence Nursing  [phone]

## 2021-12-28 NOTE — PROGRESS NOTES
[facility]  Initial Ostomy Clinic Visit Note       NAME:  Summer Bird  MEDICAL RECORD NUMBER:  [id number]  AGE: 64 y.o.    GENDER: female  : 1960  TODAY'S DATE:  2021    Subjective:     Reason for Ostomy referral for eval/treatment: ileostomy leakage and wounds around the stoma      Summer Bird is a 64 y.o. female referred by:   self    Type of ostomy: ileostomy    Location of ostomy: [facility]    Ostomy history: created at [facility] [place] [place] by Dr. Young Roberts      Patient Goal of Care: improve pouching, decrease pain/discomfort        PAST MEDICAL HISTORY        Diagnosis Date    Blood circulation, collateral     GERD (gastroesophageal reflux disease)        PAST SURGICAL HISTORY    Past Surgical History:   Procedure Laterality Date    ABDOMEN SURGERY      laparotomy/bowel resection    APPENDECTOMY      CHOLECYSTECTOMY      HERNIA REPAIR      umbilical    SMALL INTESTINE SURGERY         FAMILY HISTORY    Family History   Problem Relation Age of Onset    Cancer Mother     Coronary Art Dis Mother     Diabetes Mother     Heart Disease Mother     Parkinsonism Mother     Coronary Art Dis Father     Heart Disease Father     Diabetes Brother     Heart Disease Brother     High Cholesterol Brother     High Blood Pressure Brother     Cancer Maternal Aunt     Cancer Maternal Grandmother     Diabetes Brother     High Cholesterol Brother     High Blood Pressure Brother        SOCIAL HISTORY    Social History     Tobacco Use    Smoking status: Light Tobacco Smoker    Smokeless tobacco: Never Used    Tobacco comment: 4 cigarettes a day   Substance Use Topics    Alcohol use: No    Drug use: No       ALLERGIES    Allergies   Allergen Reactions    Codeine Other (See Comments)     Causes worsening pain           Objective:      /88   Pulse 94   Temp 96.7 °F (35.9 °C) (Tympanic)   Resp 18       LABS    CBC:   Lab Results   Component Value Date    WBC 12.1 2021 RBC 5.04 07/19/2021    HGB 13.1 07/19/2021     CMP:  Albumin:    Lab Results   Component Value Date    LABALBU 4.1 07/19/2021     PT/INR:    Lab Results   Component Value Date    PROTIME 13.0 07/19/2021    INR 1.0 07/19/2021     HgBA1c:  No results found for: LABA1C  PTT: No components found for: LABPTT      Assessment:       Patient Active Problem List   Diagnosis Code    Open abdominal wall wound S31.109A    Delayed surgical wound healing T81.89XA    History of diverticulitis of colon Z87.19    Malnutrition ([address] E46    Colocutaneous fistula K63.2    Abscess L02.91    Diverticulitis K57.92    Skin ulcer ([address] L98.499    Morbid obesity ([facility] [address] E66.01    Tobacco use Z72.0    S/P colostomy ([address] Z93.3    Generalized abdominal pain R10.84    SVT (supraventricular tachycardia) ([facility]) I47.1    NSTEMI (non-ST elevated myocardial infarction) ([facility]) I21.4    Hypothyroidism E03.9    Tachycardia R00.0    Oxygen desaturation R09.02    Ileostomy in place ([address] Z93.2    Hx of supraventricular tachycardia Z86.79       Patient Active Problem List   Diagnosis    Open abdominal wall wound    Delayed surgical wound healing    History of diverticulitis of colon    Malnutrition ([address]    Colocutaneous fistula    Abscess    Diverticulitis    Skin ulcer ([address]    Morbid obesity (HCC)    Tobacco use    S/P colostomy (HCC)    Generalized abdominal pain    SVT (supraventricular tachycardia) (HCC)    NSTEMI (non-ST elevated myocardial infarction) (HCC)    Hypothyroidism    Tachycardia    Oxygen desaturation    Ileostomy in place ([address]    Hx of supraventricular tachycardia         Ostomy and Peristomal skin: immediate peristomal skin with minor denudation, circumferentially blanchable erythema. There is a hernia that bulges at the 1 to 2 o'clock peristomal area. The patient has had a significant weight gain and there is now a cavity that forms especially when she uses the rectus abdominis muscles.  The denudation has improved per the patient. She admits to \"fasting\" for the past 5 days and has only had bone broth and liquid vitamins during that time. We had a long discussion about the poor effects of long-term fasting especially as it relates to wound care. Size:24mm x 32mm oval shape  Height: retracted  Color: red  Output: no output during visit  Creases/Folds: large cavern created around site of stoma  Stents/Bridges:      Plan:     Plan of Care:     -Will try switching to a NuHope pouch, add a hernia belt, and order adhesive strips.    -Pt was encouraged to talk to her surgeon about the possibility of taking down the ileostomy and considering a hernia repair. She seemed reluctant to do so. -Strongly encouraged her to stop fasting as she mentioned wanting to continue this until the stoma site is completely healed. Explained that the body needs food and water to heal properly and pouching is the only way to properly manage peristomal skin.    -See Discharge Instructions below    Patient/Caregiver Teaching:  Level of patientunderstanding able to:     [x] Indicates understanding       [x] Needs reinforcement  [] Unsuccessful      [x] Verbal Understanding  [] Demonstrated understanding       [] No evidence of learning  [] Refused teaching         [] N/A    ___________________________________________    Discharge Instructions            [address] [facility]     Visit Discharge Instructions    DATE- 12/28/2021      HOME CARE AGENCY- N/A      SUPPLIES ORDERED THROUGH- Nanda      SUPPLIES NEEDED-    Will order NuHope pouches and Nuhope belt      OSTOMY INSTRUCTIONS-  Use powder and Cavilon to create crust  Use nubs of moldable strip paste on sides slightly away from the stoma, then put ring around the stoma. Once you place the pouch, mold it into the skin gently after warming. This will help the pouch to adhere.   Use abdominal binder until hernia belt comes        ADDITIONAL INSTRUCTIONS-      FOLLOW UP APPOINTMENT FOR OSTOMY CARE- CALL IF NEEDED [phone]      If you need medical attention outside of the business hours of the [address] [place] [address] Road please contact your PCP or go to the nearest emergency room.           Patient Signature:__________________________________________________ DATE__________    Electronically signed by Noam Osman RN on 12/28/2021 at 11:49 AM          ____________________________________________       Electronically signed by Noam Osman RN on  12/28/2021 at 1:03 PM

## 2022-06-21 ENCOUNTER — TELEPHONE (OUTPATIENT)
Dept: ORTHOPEDIC SURGERY | Age: 62
End: 2022-06-21

## 2022-06-21 NOTE — TELEPHONE ENCOUNTER
Spoke with patient and scheduled an appt with Dr. Joanna Null for right elbow mass/lump. Patient states that the MRI was completed at [address]. Patient is going to get a disc with MRI images on it and bring it to appt that is scheduled for 6/24 at [facility].

## 2022-06-21 NOTE — TELEPHONE ENCOUNTER
LVM patient stating that I forgot to mention that she should also bring any recent elbow xrays on a disc to her upcoming Dr. Eleni Millan appt, if she has had any recent elbow xrays.

## 2022-06-24 ENCOUNTER — OFFICE VISIT (OUTPATIENT)
Dept: ORTHOPEDIC SURGERY | Age: 62
End: 2022-06-24
Payer: COMMERCIAL

## 2022-06-24 VITALS — BODY MASS INDEX: 44.42 KG/M2 | WEIGHT: 283 LBS | HEIGHT: 67 IN

## 2022-06-24 DIAGNOSIS — D17.21 LIPOMA OF RIGHT FOREARM: Primary | ICD-10-CM

## 2022-06-24 DIAGNOSIS — M25.521 RIGHT ELBOW PAIN: ICD-10-CM

## 2022-06-24 PROCEDURE — 3017F COLORECTAL CA SCREEN DOC REV: CPT | Performed by: ORTHOPAEDIC SURGERY

## 2022-06-24 PROCEDURE — G8427 DOCREV CUR MEDS BY ELIG CLIN: HCPCS | Performed by: ORTHOPAEDIC SURGERY

## 2022-06-24 PROCEDURE — 99204 OFFICE O/P NEW MOD 45 MIN: CPT | Performed by: ORTHOPAEDIC SURGERY

## 2022-06-24 PROCEDURE — 1036F TOBACCO NON-USER: CPT | Performed by: ORTHOPAEDIC SURGERY

## 2022-06-24 PROCEDURE — G8419 CALC BMI OUT NRM PARAM NOF/U: HCPCS | Performed by: ORTHOPAEDIC SURGERY

## 2022-06-24 RX ORDER — LEVOTHYROXINE, LIOTHYRONINE 9.5; 2.25 UG/1; UG/1
15 TABLET ORAL DAILY
COMMUNITY
Start: 2022-05-31

## 2022-06-24 NOTE — LETTER
[address]  [address] [facility] [address]  Phone: [phone]  Fax: [phone]    Karine Gonzales MD        June 24, 2022     Patient: Bebo Valenzuela   YOB: 1960   Date of Visit: 6/24/2022       To Whom it May Concern:    Francisco Stone was seen in my clinic on 6/24/2022. If you have any questions or concerns, please don't hesitate to call.     Sincerely,         Karine Gonzales MD

## 2022-06-24 NOTE — PROGRESS NOTES
Orthopedic Elbow Encounter Note     Chief complaint: Right elbow mass    HPI: Maxine Baltazar is a 64 y.o. right-hand-dominant female presenting for evaluation of her right elbow. She states that she has had a mass which was a very small lump over the right elbow for at least 5 years. In the past year and a half she is experienced an increase in the size of that mass and its become painful depending on what she is doing with this arm. She denies having any fevers, chills, sweats or any constitutional symptoms and also denies having any numbness or tingling. Previous treatment:    NSAIDs: None    Injections:  None    Physical therapy: No    Surgeries: None    Review of Systems:     Constitution: no fever or chills   Pain level: 2/10  Musculoskeletal: As noted in the HPI   Neurologic: None    Past Medical Hx, Past Surgical Hx, Medications, Allergies, Social Hx: These were all reviewed. Please refer to Electronic Medical Records for details.      Physical Exam:     Ht 5' 7\" (1.702 m)   Wt 283 lb (128.4 kg)   BMI 44.32 kg/m²    General Appearance: alert, well appearing, and in no distress  Mental Status: alert, oriented to person, place, and time    Elbows:    Skin: warm and dry, no rash or erythema  Vasculature: 2+ radial pulses bilaterally  Sensation: Intact to light touch in radial, ulnar, and median nerve distributions bilaterally    ROM: (Degrees)    Right   A  Left   A     Flexion   140  Flexion   145   Extension  0  Extension  0    Pronation  80  Pronaton  80   Supination  75  Supination  75     Crepitation  No  Crepitation  No    Muscle strength:    Right       Left    Biceps   5    Biceps   5  Triceps  5    Triceps  5  Wrist flexion  5    Wrist flexion  5  Wrist extension 5    Wrist extension 5  Intrinsics  5    Intrinsics  5    Tenderness: n     Tenderness: n    Special tests    Right    Ulnar Nerve     Left  n    Cubital tunnel bend    n  n    Tinnel's at elbow    n        Biceps  n    Bicipital tenderness    n  n    Defect at biceps insertion   n        Instability  n    LCL instability     n  n    MCL instability     n  n    Moving valgus test    n  n    Milk sign     n  n    PLRI pivot     n        Epicondylitis  n    Resisted WE Pain    n  n    Resisted WF Pain    n  n    Resisted Supination Pain   n  n    Resisted Pronation Pain   n        Arthritis  n    Clunk      n  n    Pain at extremes of motion   n  n    Pain during arc of motion   n      Imaging:  Xrays: 3 views of the right elbow obtained on 6/24/2022 were independently reviewed  Indications: Right elbow pain  Findings: Normal joint spaces. No obvious fracture, dislocation, or subluxation. No obvious osseous abnormality  Impression: Normal right elbow radiographs    MRI: MRI of the left elbow completed on 5/10/2021 was reviewed independently demonstrating no significant chondral damage. No obvious or significant tendon damage. Small to moderately sized lipoma noted over the anterior aspect of the elbow. Impression/Plan:     Tad Cervantes is a 64 y.o. 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 I had a discussion with the patient today about this educating her about the condition and discussed treatment options. Given the fact that it has grown in size and has become symptomatic I recommended having it excised. Consequently she was referred to Dr. Deyanira Beltran at [facility] and orthopedic oncologist.  I will have her follow-up my clinic as needed but she may return or call at anytime with persistent or worsening symptoms and with any questions or concerns. This note is created with the assistance of a speech recognition program.  While intending to generate adocument that actually reflects the content of the visit, the document can still have some errors including those of syntax and sound a like substitutions which may escape proof reading.   It such instances, actual meaningcan be extrapolated by contextual diversion.     NA = Not assessed  y = Yes  n = No

## 2022-07-19 ENCOUNTER — HOSPITAL ENCOUNTER (OUTPATIENT)
Dept: WOUND CARE | Age: 62
Discharge: HOME | End: 2022-07-19
Payer: COMMERCIAL

## 2022-07-19 VITALS
RESPIRATION RATE: 18 BRPM | HEART RATE: 96 BPM | TEMPERATURE: 96.8 F | SYSTOLIC BLOOD PRESSURE: 135 MMHG | DIASTOLIC BLOOD PRESSURE: 85 MMHG

## 2022-07-19 DIAGNOSIS — K43.5 PARASTOMAL HERNIA WITHOUT OBSTRUCTION OR GANGRENE: ICD-10-CM

## 2022-07-19 DIAGNOSIS — L30.8 PERISTOMAL DERMATITIS ASSOCIATED WITH MOISTURE: ICD-10-CM

## 2022-07-19 PROBLEM — L98.499 SKIN ULCER (HCC): Status: RESOLVED | Noted: 2020-02-19 | Resolved: 2022-07-19

## 2022-07-19 PROCEDURE — 99211 OFF/OP EST MAY X REQ PHY/QHP: CPT

## 2022-07-19 PROCEDURE — 99213 OFFICE O/P EST LOW 20 MIN: CPT

## 2022-07-19 NOTE — DISCHARGE INSTRUCTIONS
[address] [facility] [address] [facility]     Visit Discharge Instructions    DATE- 07/19/2022      HOME CARE AGENCY- N/A      SUPPLIES ORDERED THROUGH-      SUPPLIES NEEDED-      OSTOMY INSTRUCTIONS-  Changes in pouching technique:    -Use a crusting technique with small amount of stoma powder first, then layer with skin barrier wipe. -Cut pouch off-center in different directions each time you change it to offload the pressure from the pouch  -Only wear belt as tight as needed to avoid pressure injjury  -Take a break from the belt during the day to allow the peristomal skin some pressure relief          ADDITIONAL INSTRUCTIONS-  Will provide a sample of the softer pouch if you want to try it later. Let me know if you would like to order some of these. Here is the item number for the effective adhesive remover we used today: [employer] ref# [id number]      [address] [phone]      If you need medical attention outside of the business hours of the [address] [place] [address] Road please contact your PCP or go to the nearest emergency room.           Patient Signature:__________________________________________________ DATE__________  Electronically signed by RADHA George CNP on 7/19/2022 at 1:21 PM

## 2022-07-19 NOTE — PROGRESS NOTES
[facility] Continence Nurse  Initial Ostomy Clinic Visit Note       NAME:  Julian Ochoa  MEDICAL RECORD NUMBER:  [id number]  AGE: 64 y.o. GENDER: female  : 1960  TODAY'S DATE:  2022    Subjective:     Reason for Ostomy referral for eval/treatment: ileostomy with wound in peristomal area      Julian Ochoa is a 64 y.o. female referred by:   Self-referral    Type of ostomy: Ileostomy    Location of ostomy: 8 upper quadrant    Ostomy history: created at [facility] by Dr. Josue Lagunas       Patient Goal of Care: Improve pouching and heel wound and peristomal area        PAST MEDICAL HISTORY        Diagnosis Date    Blood circulation, collateral     GERD (gastroesophageal reflux disease)        PAST SURGICAL HISTORY    Past Surgical History:   Procedure Laterality Date    ABDOMEN SURGERY      laparotomy/bowel resection    APPENDECTOMY      CHOLECYSTECTOMY      HERNIA REPAIR      umbilical    SMALL INTESTINE SURGERY         FAMILY HISTORY    Family History   Problem Relation Age of Onset    Cancer Mother     Coronary Art Dis Mother     Diabetes Mother     Heart Disease Mother     Parkinsonism Mother     Coronary Art Dis Father     Heart Disease Father     Diabetes Brother     Heart Disease Brother     High Cholesterol Brother     High Blood Pressure Brother     Cancer Maternal Aunt     Cancer Maternal Grandmother     Diabetes Brother     High Cholesterol Brother     High Blood Pressure Brother        SOCIAL HISTORY    Social History     Tobacco Use    Smoking status: Former    Smokeless tobacco: Never    Tobacco comments:     4 cigarettes a day   Substance Use Topics    Alcohol use: No    Drug use: No       ALLERGIES    Allergies   Allergen Reactions    Codeine Other (See Comments)     Causes worsening pain         Review of Systems:  ?Constitutional: negative for chills, fevers, sweats  ? Respiratory: negative for cough or dyspnea on exertion  ? Cardiovascular: negative for extremity edema  ? GI/: ileostomy  ? Integumentary: Peristomal wound reported by patient, otherwise no wounds/lesions  ? Behavioral/Psych: negative  ? Pain: Denies, states that she has been doing acupuncture for wellness and pain management        Objective:      /85   Pulse 96   Temp 96.8 °F (36 °C) (Tympanic)   Resp 18       LABS    CBC:   Lab Results   Component Value Date/Time    WBC 12.1 07/19/2021 08:25 AM    RBC 5.04 07/19/2021 08:25 AM    HGB 13.1 07/19/2021 08:25 AM     CMP:  Albumin:    Lab Results   Component Value Date/Time    LABALBU 4.1 07/19/2021 08:25 AM     PT/INR:    Lab Results   Component Value Date/Time    PROTIME 13.0 07/19/2021 08:25 AM    INR 1.0 07/19/2021 08:25 AM     HgBA1c:  No results found for: LABA1C  PTT: No components found for: LABPTT      Physical Exam:  ?General Appearance: alert and oriented to person, place and time, well-developed and obese, in no acute distress  ? Skin: See below for peristomal skin disruption, warm and dry, no rash or erythema   ? Pulmonary/Chest: normal air movement, no respiratory distress  ? Abdomen/GI: Large parastomal hernia noted, scar tissue from multiple abdominal surgeries noted  ? GenitoUrinary: Negative  ? Extremities: no edema, no cyanosis, no hyperpigmentation      Assessment:       Patient Active Problem List   Diagnosis Code    Open abdominal wall wound S31.109A    Delayed surgical wound healing T81.89XA    History of diverticulitis of colon Z87.19    Malnutrition ([facility] E46    Colocutaneous fistula K63.2    Abscess L02.91    Diverticulitis K57.92    Morbid obesity ([facility]CC) E66.01    Tobacco use Z72.0    Generalized abdominal pain R10.84    SVT (supraventricular tachycardia) ([facility]) I47.1    NSTEMI (non-ST elevated myocardial infarction) ([facility] I21.4    Hypothyroidism E03.9    Tachycardia R00.0    Oxygen desaturation R09.02    Ileostomy in place ([facility] Z93.2    Hx of supraventricular tachycardia Z86.79    Parastomal hernia without obstruction or gangrene K43.5    Peristomal dermatitis associated with moisture L30.8       Patient Active Problem List   Diagnosis    Open abdominal wall wound    Delayed surgical wound healing    History of diverticulitis of colon    Malnutrition ([facility]    Colocutaneous fistula    Abscess    Diverticulitis    Morbid obesity ([facility]    Tobacco use    Generalized abdominal pain    SVT (supraventricular tachycardia) (HCC)    NSTEMI (non-ST elevated myocardial infarction) (HCC)    Hypothyroidism    Tachycardia    Oxygen desaturation    Ileostomy in place [facility])    Hx of supraventricular tachycardia    Parastomal hernia without obstruction or gangrene    Peristomal dermatitis associated with moisture         Ostomy and Peristomal skin: Upon removal of ostomy, it is noted that the patient has some purple discoloration and a ring that resembles the shape of the convex pouch being used. This is quite minor, but likely contributing to potential for pressure injury. There is a small wound against the immediate distal peristomal junction. This appears to be denudation from moisture associated skin damage. Likely caused by leakage from the ileostomy. The stoma itself is somewhat retracted and the skin around the stoma creates a concave appearance to the stoma. This is further exaggerated by the parastomal hernia that is largest proximally. The patient states that she forgot about the possibility of using a crusting technique until I told her about it a few days ago over the phone making this appointment. She has been using a crusting technique and the wound appears to have been improving slightly. Talked at length about the option of using a hernia belt, she states that we had ordered her one before and she did not like it so she did not wear it. It was not comfortable for her. So she does not want to order another. She is also reluctant to switch to a different type of pouching system at this time.   I think it will be helpful for her to continue using a crusting technique to heal this wound area, gave her advice on using a little bit less stoma paste than she has been using, and encouraged her to remove the bowel at some point during the day for a rest to allow pressure reduction around the peristomal area. These changes may be enough to see improvement. She was given a different pouch to trial that is a softer convexity, if she continues to have problems with the current pouching system she will attempt to use. Plan:     Plan of Care:     -Use crusting technique to manage RAMIREZ stomal denudation prior to placing pouch.  - Use just a small bead of paste on the cut edge of the pouch.  - Take a rest break from using the belt at some point during the day to allow peristomal pressure reduction  - If these changes do not help, try using the softer convex pouch provided and make a follow-up appointment in the ostomy clinic.    -See Discharge Instructions below    Patient/Caregiver Teaching:  Level of patientunderstanding able to:     [x] Indicates understanding       [x] Needs reinforcement  [] Unsuccessful      [] Verbal Understanding  [] Demonstrated understanding       [] No evidence of learning  [] Refused teaching         [] N/A    ___________________________________________    Discharge Instructions              [address] [facility]     Visit Discharge Instructions    DATE- 07/19/2022      HOME CARE AGENCY- N/A      SUPPLIES ORDERED THROUGH-      SUPPLIES NEEDED-      OSTOMY INSTRUCTIONS-  Changes in pouching technique:    -Use a crusting technique with small amount of stoma powder first, then layer with skin barrier wipe.   -Cut pouch off-center in different directions each time you change it to offload the pressure from the pouch  -Only wear belt as tight as needed to avoid pressure injjury  -Take a break from the belt during the day to allow the peristomal skin some pressure relief          ADDITIONAL INSTRUCTIONS-  Will provide a sample of the softer pouch if you want to try it later. Let me know if you would like to order some of these. Here is the item number for the effective adhesive remover we used today: [employer]  ref# [id number]      [address] [phone]      If you need medical attention outside of the business hours of the [address] [place] [address] Road please contact your PCP or go to the nearest emergency room.           Patient Signature:__________________________________________________ DATE__________  Electronically signed by RADHA Velasquez Sa, CNP on 7/19/2022 at 1:21 PM            ____________________________________________       Electronically signed by RADHA Velasquez Sa, CNP on  7/19/2022 at 2:39 PM

## 2022-08-15 LAB — GLUCOSE BLD-MCNC: 120 MG/DL (ref 70–100)

## 2023-01-10 ENCOUNTER — TELEPHONE (OUTPATIENT)
Dept: WOUND CARE | Age: 63
End: 2023-01-10

## 2023-01-10 NOTE — TELEPHONE ENCOUNTER
Return called the patient. She has a small open wound on skin in close proximity to the ileostomy. We have managed this many times in the past due to her body habitus that the pouches rub and cause pressure injuries. We have tried several different types of pouches. This current pouch (2pc [address] [address] convex light) has had the best outcomes despite occasional wounds. She was reminded how to correctly pouch the ileostomy, avoid use of belt, and to reduce her activities in order to allow the wound to heal. She has silvercel dressing at home and was reminded how to properly use it. Will send samples from manufacturers for a newer softer convexity extended wear line. She will follow up with me via phone later this week and if not improved, will be seen in clinic.      Connor BHATTI, DESMOND, [place] and [address]  Wound, Ostomy, and Continence Nursing  [phone]

## 2023-04-24 ENCOUNTER — HOSPITAL ENCOUNTER (OUTPATIENT)
Dept: NON INVASIVE DIAGNOSTICS | Age: 63
Discharge: HOME | End: 2023-04-24
Payer: COMMERCIAL

## 2023-04-24 DIAGNOSIS — R00.0 TACHYCARDIA: ICD-10-CM

## 2023-04-24 DIAGNOSIS — I47.1 PAROXYSMAL SUPRAVENTRICULAR TACHYCARDIA (HCC): ICD-10-CM

## 2023-04-24 DIAGNOSIS — R94.31 ABNORMAL EKG: ICD-10-CM

## 2023-04-24 LAB
LV EF: 48 %
LVEF MODALITY: NORMAL

## 2023-04-24 PROCEDURE — 93306 TTE W/DOPPLER COMPLETE: CPT

## 2023-07-05 PROBLEM — R94.31 ABNORMAL ECG: Status: ACTIVE | Noted: 2023-07-05

## 2023-07-05 PROBLEM — I38 SYSTOLIC HEART FAILURE DUE TO VALVULAR DISEASE (HCC): Status: ACTIVE | Noted: 2023-07-05

## 2023-07-05 PROBLEM — I50.20 SYSTOLIC HEART FAILURE DUE TO VALVULAR DISEASE (HCC): Status: ACTIVE | Noted: 2023-07-05

## 2023-07-05 PROBLEM — I51.9 LV DYSFUNCTION: Status: ACTIVE | Noted: 2023-07-05

## 2023-07-05 PROBLEM — R93.1 ABNORMAL ECHOCARDIOGRAM: Status: ACTIVE | Noted: 2023-07-05

## 2023-07-05 PROBLEM — I49.3 VENTRICULAR PREMATURE BEATS: Status: ACTIVE | Noted: 2023-07-05

## 2023-07-31 ENCOUNTER — HOSPITAL ENCOUNTER (OUTPATIENT)
Dept: CT IMAGING | Age: 63
Discharge: HOME | End: 2023-08-02
Attending: INTERNAL MEDICINE
Payer: COMMERCIAL

## 2023-07-31 VITALS — RESPIRATION RATE: 18 BRPM | DIASTOLIC BLOOD PRESSURE: 67 MMHG | SYSTOLIC BLOOD PRESSURE: 109 MMHG | HEART RATE: 65 BPM

## 2023-07-31 DIAGNOSIS — R93.1 ABNORMAL ECHOCARDIOGRAM: ICD-10-CM

## 2023-07-31 DIAGNOSIS — I38 SYSTOLIC HEART FAILURE DUE TO VALVULAR DISEASE, UNSPECIFIED HEART FAILURE CHRONICITY (HCC): ICD-10-CM

## 2023-07-31 DIAGNOSIS — R94.31 ABNORMAL ECG: ICD-10-CM

## 2023-07-31 DIAGNOSIS — I49.3 VENTRICULAR PREMATURE BEATS: ICD-10-CM

## 2023-07-31 DIAGNOSIS — I50.20 SYSTOLIC HEART FAILURE DUE TO VALVULAR DISEASE, UNSPECIFIED HEART FAILURE CHRONICITY (HCC): ICD-10-CM

## 2023-07-31 DIAGNOSIS — I51.9 LV DYSFUNCTION: ICD-10-CM

## 2023-07-31 LAB
EGFR, POC: >60 ML/MIN/1.73M2
POC CREATININE: 0.7 MG/DL (ref 0.51–1.19)

## 2023-07-31 PROCEDURE — 6370000000 HC RX 637 (ALT 250 FOR IP): Performed by: INTERNAL MEDICINE

## 2023-07-31 PROCEDURE — 2580000003 HC RX 258: Performed by: INTERNAL MEDICINE

## 2023-07-31 PROCEDURE — 75574 CT ANGIO HRT W/3D IMAGE: CPT

## 2023-07-31 PROCEDURE — 82565 ASSAY OF CREATININE: CPT

## 2023-07-31 PROCEDURE — 6360000004 HC RX CONTRAST MEDICATION: Performed by: INTERNAL MEDICINE

## 2023-07-31 RX ORDER — NITROGLYCERIN 0.4 MG/1
0.4 TABLET SUBLINGUAL ONCE
Status: COMPLETED | OUTPATIENT
Start: 2023-07-31 | End: 2023-07-31

## 2023-07-31 RX ORDER — 0.9 % SODIUM CHLORIDE 0.9 %
100 INTRAVENOUS SOLUTION INTRAVENOUS ONCE
Status: COMPLETED | OUTPATIENT
Start: 2023-07-31 | End: 2023-07-31

## 2023-07-31 RX ORDER — SODIUM CHLORIDE 0.9 % (FLUSH) 0.9 %
10 SYRINGE (ML) INJECTION PRN
Status: DISCONTINUED | OUTPATIENT
Start: 2023-07-31 | End: 2023-08-03 | Stop reason: HOSPADM

## 2023-07-31 RX ADMIN — SODIUM CHLORIDE 100 ML: 9 INJECTION, SOLUTION INTRAVENOUS at 10:38

## 2023-07-31 RX ADMIN — NITROGLYCERIN 0.4 MG: 0.4 TABLET, ORALLY DISINTEGRATING SUBLINGUAL at 09:48

## 2023-07-31 RX ADMIN — IOPAMIDOL 100 ML: 755 INJECTION, SOLUTION INTRAVENOUS at 10:37

## 2023-07-31 RX ADMIN — SODIUM CHLORIDE, PRESERVATIVE FREE 10 ML: 5 INJECTION INTRAVENOUS at 10:38

## 2024-01-16 ENCOUNTER — HOSPITAL ENCOUNTER (OUTPATIENT)
Dept: WOUND CARE | Age: 64
Discharge: HOME | End: 2024-01-16
Payer: COMMERCIAL

## 2024-01-16 PROCEDURE — 99211 OFF/OP EST MAY X REQ PHY/QHP: CPT

## 2024-01-16 NOTE — DISCHARGE INSTR - COC
Continuity of Care Form    Patient Name: Phyllis Reece   :  1960  MRN:  [id number]    Admit date:  2024  Discharge date:  ***    Code Status Order: Prior   Advance Directives:     Admitting Physician:  No admitting provider for patient encounter.  PCP: Carlyle Archuleta    Discharging Nurse: ***  Discharging Hospital Unit/Room#: No information available for this encounter.  Discharging Unit Phone Number: ***    Emergency Contact:   Extended Emergency Contact Information  Primary Emergency Contact: FrankoaleciaLucy  Home Phone: [phone]  Work Phone: [phone]  Mobile Phone: [phone]  Relation: Child  Preferred language: English   needed? No  Secondary Emergency Contact: Ann Reece/[place]   [facility]  Home Phone: [phone]  Work Phone: [phone]  Mobile Phone: [phone]  Relation: Other    Past Surgical History:  Past Surgical History:   Procedure Laterality Date    ABDOMEN SURGERY      laparotomy/bowel resection    APPENDECTOMY      CHOLECYSTECTOMY      HERNIA REPAIR      umbilical    SMALL INTESTINE SURGERY         Immunization History:     There is no immunization history on file for this patient.    Active Problems:  Patient Active Problem List   Diagnosis Code    Open abdominal wall wound S31.109A    Delayed surgical wound healing T81.89XA    History of diverticulitis of colon Z87.19    Malnutrition ([facility]) E46    Colocutaneous fistula K63.2    Abscess L02.91    Diverticulitis K57.92    Morbid obesity ([facility]) E66.01    Tobacco use Z72.0    Generalized abdominal pain R10.84    SVT (supraventricular tachycardia) I47.10    NSTEMI (non-ST elevated myocardial infarction) ([facility]) I21.4    Hypothyroidism E03.9    Tachycardia R00.0    Oxygen desaturation R09.02    Ileostomy in place ([facility]) Z93.2    Hx of supraventricular tachycardia Z86.79    Parastomal hernia without obstruction or gangrene K43.5    Peristomal dermatitis associated with moisture L30.8    Abnormal ECG R94.31

## 2024-01-16 NOTE — PROGRESS NOTES
Outpatient visit for Loop Colostomy Care and Teaching.          History: Surgery: laparotomy with drainage of multiple abdominal abscesses and small bowel resection, laparotomy with extensive lysis of adhesions and drainage of abscesses with diverting loop colostomy 12/22/2017 with Dr Izaguirre at [facility].    Calls the ostomy clinic with reports of weight gain resulting in changes to the stoma size, shape, and appearance. Peristomal skin retraction with forward bending. Peristomal skin breakdown due to skin stripping and exposure to effluent.    Nanda for DME orders. Currently utilizing [employer] Convex Light Ostomy Barrier # [id number] and matching Coloplast Flex Ostomy Pouch #[id number], Brava Paste vs Brava protective Seals, Barrier Extenders, and an ostomy Belt.    Patient removed the pouch independently. Adhesive remover offered and used by patient for portion of pouch removal.   Skin cleansed with warm water and saline.   The peristomal skin is pink and intact. Some evidence of adhesive related skin injury noted.   She has concern for a possible granuloma at 8-9  o'clock. The stomal edge is irregular and flush without height, bleeding, or erosion.     OSTOMY ASSESSMENT:     01/16/24 1043   Colostomy RLQ Loop   Placement Date: 12/17/17   Pre-existing: Yes  Inserted by: Dr Danna Izaguirre  Location: RLQ  Colostomy Type: Loop   Stomal Appliance Changed   Stoma  Assessment Flush;Moist;Red  (25 mm x 28 mm)   Peristomal Assessment Pink;Clean, dry & intact   Mucocutaneous Junction Intact   Stool Appearance Soft   Stool Color Brown   Stool Amount Medium       Reviewed anatomy of a right sided loop colostomy using diagrams and verbal explanation. Reviewed tips to slow peristalsis such as avoiding liquids before and during meals. Possibly add fiber such as Metamucil into her daily regimen to further bulk the stool.      Equipment used today and provided samples for initial home use:  Coloplast SenSura Andrea Deep Convex MAXI

## 2024-01-16 NOTE — DISCHARGE INSTRUCTIONS
Pouching System Update:  Coloplast SenSura Andrea Deep Convex MAXI Drainable Pouch #[id number] cut to fit. [facility]   Continue use of belt, paste or rings, and barrier extenders as previous.   Contact [place] to update order.  We will contact [employer] [facility] to request samples.

## 2024-05-09 ENCOUNTER — HOSPITAL ENCOUNTER (OUTPATIENT)
Age: 64
Setting detail: SPECIMEN
Discharge: HOME | End: 2024-05-09

## 2024-05-09 LAB
ANION GAP SERPL CALCULATED.3IONS-SCNC: 11 MMOL/L (ref 9–16)
BUN SERPL-MCNC: 16 MG/DL (ref 8–23)
CALCIUM SERPL-MCNC: 9.4 MG/DL (ref 8.6–10.4)
CHLORIDE SERPL-SCNC: 102 MMOL/L (ref 98–107)
CO2 SERPL-SCNC: 26 MMOL/L (ref 20–31)
CREAT SERPL-MCNC: 0.7 MG/DL (ref 0.5–0.9)
ERYTHROCYTE [DISTWIDTH] IN BLOOD BY AUTOMATED COUNT: 15.3 % (ref 11.8–14.4)
GFR, ESTIMATED: >90 ML/MIN/1.73M2
GLUCOSE SERPL-MCNC: 107 MG/DL (ref 74–99)
HCT VFR BLD AUTO: 44.7 % (ref 36.3–47.1)
HGB BLD-MCNC: 13.9 G/DL (ref 11.9–15.1)
MCH RBC QN AUTO: 27.6 PG (ref 25.2–33.5)
MCHC RBC AUTO-ENTMCNC: 31.1 G/DL (ref 28.4–34.8)
MCV RBC AUTO: 88.9 FL (ref 82.6–102.9)
NRBC BLD-RTO: 0 PER 100 WBC
PLATELET # BLD AUTO: 277 K/UL (ref 138–453)
PMV BLD AUTO: 10.5 FL (ref 8.1–13.5)
POTASSIUM SERPL-SCNC: 4.2 MMOL/L (ref 3.7–5.3)
RBC # BLD AUTO: 5.03 M/UL (ref 3.95–5.11)
SODIUM SERPL-SCNC: 139 MMOL/L (ref 136–145)
WBC OTHER # BLD: 9.5 K/UL (ref 3.5–11.3)

## 2024-05-09 PROCEDURE — 36415 COLL VENOUS BLD VENIPUNCTURE: CPT

## 2024-05-09 PROCEDURE — 80048 BASIC METABOLIC PNL TOTAL CA: CPT

## 2024-05-09 PROCEDURE — P9603 ONE-WAY ALLOW PRORATED MILES: HCPCS

## 2024-05-09 PROCEDURE — 85027 COMPLETE CBC AUTOMATED: CPT

## 2024-05-16 ENCOUNTER — HOSPITAL ENCOUNTER (OUTPATIENT)
Age: 64
Setting detail: SPECIMEN
Discharge: HOME | End: 2024-05-16

## 2024-05-17 ENCOUNTER — HOSPITAL ENCOUNTER (OUTPATIENT)
Age: 64
Setting detail: SPECIMEN
Discharge: HOME | End: 2024-05-17

## 2024-05-17 LAB
ANION GAP SERPL CALCULATED.3IONS-SCNC: 12 MMOL/L (ref 9–16)
BUN SERPL-MCNC: 12 MG/DL (ref 8–23)
CALCIUM SERPL-MCNC: 9.3 MG/DL (ref 8.6–10.4)
CHLORIDE SERPL-SCNC: 106 MMOL/L (ref 98–107)
CO2 SERPL-SCNC: 24 MMOL/L (ref 20–31)
CREAT SERPL-MCNC: 0.8 MG/DL (ref 0.5–0.9)
ERYTHROCYTE [DISTWIDTH] IN BLOOD BY AUTOMATED COUNT: 14.9 % (ref 11.8–14.4)
GFR, ESTIMATED: 89 ML/MIN/1.73M2
GLUCOSE SERPL-MCNC: 107 MG/DL (ref 74–99)
HCT VFR BLD AUTO: 42.2 % (ref 36.3–47.1)
HGB BLD-MCNC: 12.9 G/DL (ref 11.9–15.1)
MCH RBC QN AUTO: 27.3 PG (ref 25.2–33.5)
MCHC RBC AUTO-ENTMCNC: 30.6 G/DL (ref 28.4–34.8)
MCV RBC AUTO: 89.4 FL (ref 82.6–102.9)
NRBC BLD-RTO: 0 PER 100 WBC
PLATELET # BLD AUTO: 265 K/UL (ref 138–453)
PMV BLD AUTO: 10.8 FL (ref 8.1–13.5)
POTASSIUM SERPL-SCNC: 3.8 MMOL/L (ref 3.7–5.3)
RBC # BLD AUTO: 4.72 M/UL (ref 3.95–5.11)
SODIUM SERPL-SCNC: 142 MMOL/L (ref 136–145)
WBC OTHER # BLD: 7.7 K/UL (ref 3.5–11.3)

## 2024-05-17 PROCEDURE — 36415 COLL VENOUS BLD VENIPUNCTURE: CPT

## 2024-05-17 PROCEDURE — 85027 COMPLETE CBC AUTOMATED: CPT

## 2024-05-17 PROCEDURE — P9603 ONE-WAY ALLOW PRORATED MILES: HCPCS

## 2024-05-17 PROCEDURE — 80048 BASIC METABOLIC PNL TOTAL CA: CPT

## 2025-06-18 ENCOUNTER — TELEPHONE (OUTPATIENT)
Dept: WOUND CARE | Age: 65
End: 2025-06-18